# Patient Record
Sex: MALE | Race: WHITE | Employment: FULL TIME | URBAN - METROPOLITAN AREA
[De-identification: names, ages, dates, MRNs, and addresses within clinical notes are randomized per-mention and may not be internally consistent; named-entity substitution may affect disease eponyms.]

---

## 2018-11-01 PROBLEM — E66.3 OVERWEIGHT (BMI 25.0-29.9): Status: ACTIVE | Noted: 2018-11-01

## 2018-11-01 PROBLEM — G44.52 NEW DAILY PERSISTENT HEADACHE: Status: ACTIVE | Noted: 2018-11-01

## 2018-11-01 PROBLEM — K21.9 GERD (GASTROESOPHAGEAL REFLUX DISEASE): Status: ACTIVE | Noted: 2018-11-01

## 2022-03-19 PROBLEM — G44.52 NEW DAILY PERSISTENT HEADACHE: Status: ACTIVE | Noted: 2018-11-01

## 2022-03-19 PROBLEM — K21.9 GERD (GASTROESOPHAGEAL REFLUX DISEASE): Status: ACTIVE | Noted: 2018-11-01

## 2022-03-19 PROBLEM — E66.3 OVERWEIGHT (BMI 25.0-29.9): Status: ACTIVE | Noted: 2018-11-01

## 2022-05-07 ENCOUNTER — APPOINTMENT (OUTPATIENT)
Dept: ULTRASOUND IMAGING | Age: 37
End: 2022-05-07
Attending: EMERGENCY MEDICINE
Payer: COMMERCIAL

## 2022-05-07 ENCOUNTER — APPOINTMENT (OUTPATIENT)
Dept: CT IMAGING | Age: 37
End: 2022-05-07
Attending: EMERGENCY MEDICINE
Payer: COMMERCIAL

## 2022-05-07 ENCOUNTER — HOSPITAL ENCOUNTER (EMERGENCY)
Age: 37
Discharge: HOME | End: 2022-05-07
Attending: EMERGENCY MEDICINE
Payer: COMMERCIAL

## 2022-05-07 VITALS
TEMPERATURE: 98.5 F | BODY MASS INDEX: 28.77 KG/M2 | SYSTOLIC BLOOD PRESSURE: 129 MMHG | DIASTOLIC BLOOD PRESSURE: 98 MMHG | HEIGHT: 66 IN | HEART RATE: 80 BPM | WEIGHT: 179 LBS | RESPIRATION RATE: 18 BRPM | OXYGEN SATURATION: 100 %

## 2022-05-07 DIAGNOSIS — N50.3 CYST OF EPIDIDYMIS: ICD-10-CM

## 2022-05-07 DIAGNOSIS — R11.2 NAUSEA AND VOMITING, UNSPECIFIED VOMITING TYPE: Primary | ICD-10-CM

## 2022-05-07 DIAGNOSIS — K40.90 LEFT INGUINAL HERNIA: ICD-10-CM

## 2022-05-07 LAB
ALBUMIN SERPL-MCNC: 4 G/DL (ref 3.5–5)
ALBUMIN/GLOB SERPL: 1.1 {RATIO} (ref 1.2–3.5)
ALP SERPL-CCNC: 92 U/L (ref 50–136)
ALT SERPL-CCNC: 28 U/L (ref 12–65)
ANION GAP SERPL CALC-SCNC: 5 MMOL/L (ref 7–16)
AST SERPL-CCNC: 14 U/L (ref 15–37)
BASOPHILS # BLD: 0.1 K/UL (ref 0–0.2)
BASOPHILS NFR BLD: 1 % (ref 0–2)
BILIRUB SERPL-MCNC: 0.7 MG/DL (ref 0.2–1.1)
BILIRUB UR QL: NEGATIVE
BUN SERPL-MCNC: 18 MG/DL (ref 6–23)
CALCIUM SERPL-MCNC: 8.9 MG/DL (ref 8.3–10.4)
CHLORIDE SERPL-SCNC: 109 MMOL/L (ref 98–107)
CO2 SERPL-SCNC: 31 MMOL/L (ref 21–32)
CREAT SERPL-MCNC: 1.2 MG/DL (ref 0.8–1.5)
DIFFERENTIAL METHOD BLD: ABNORMAL
EOSINOPHIL # BLD: 0.1 K/UL (ref 0–0.8)
EOSINOPHIL NFR BLD: 1 % (ref 0.5–7.8)
ERYTHROCYTE [DISTWIDTH] IN BLOOD BY AUTOMATED COUNT: 12.5 % (ref 11.9–14.6)
GLOBULIN SER CALC-MCNC: 3.5 G/DL (ref 2.3–3.5)
GLUCOSE SERPL-MCNC: 120 MG/DL (ref 65–100)
GLUCOSE UR QL STRIP.AUTO: NEGATIVE MG/DL
HCT VFR BLD AUTO: 51.3 % (ref 41.1–50.3)
HGB BLD-MCNC: 16.6 G/DL (ref 13.6–17.2)
IMM GRANULOCYTES # BLD AUTO: 0 K/UL (ref 0–0.5)
IMM GRANULOCYTES NFR BLD AUTO: 0 % (ref 0–5)
KETONES UR-MCNC: NEGATIVE MG/DL
LEUKOCYTE ESTERASE UR QL STRIP: NEGATIVE
LIPASE SERPL-CCNC: 107 U/L (ref 73–393)
LYMPHOCYTES # BLD: 0.6 K/UL (ref 0.5–4.6)
LYMPHOCYTES NFR BLD: 6 % (ref 13–44)
MCH RBC QN AUTO: 28.2 PG (ref 26.1–32.9)
MCHC RBC AUTO-ENTMCNC: 32.4 G/DL (ref 31.4–35)
MCV RBC AUTO: 87.2 FL (ref 79.6–97.8)
MONOCYTES # BLD: 0.3 K/UL (ref 0.1–1.3)
MONOCYTES NFR BLD: 4 % (ref 4–12)
NEUTS SEG # BLD: 8.4 K/UL (ref 1.7–8.2)
NEUTS SEG NFR BLD: 89 % (ref 43–78)
NITRITE UR QL: NEGATIVE
NRBC # BLD: 0 K/UL (ref 0–0.2)
PH UR: 6 [PH] (ref 5–9)
PLATELET # BLD AUTO: 265 K/UL (ref 150–450)
PMV BLD AUTO: 9.6 FL (ref 9.4–12.3)
POTASSIUM SERPL-SCNC: 3.5 MMOL/L (ref 3.5–5.1)
PROT SERPL-MCNC: 7.5 G/DL (ref 6.3–8.2)
PROT UR QL: ABNORMAL MG/DL
RBC # BLD AUTO: 5.88 M/UL (ref 4.23–5.6)
RBC # UR STRIP: NEGATIVE /UL
SERVICE CMNT-IMP: ABNORMAL
SODIUM SERPL-SCNC: 145 MMOL/L (ref 136–145)
SP GR UR: >1.03 (ref 1–1.02)
UROBILINOGEN UR QL: 1 EU/DL (ref 0.2–1)
WBC # BLD AUTO: 9.5 K/UL (ref 4.3–11.1)

## 2022-05-07 PROCEDURE — 76870 US EXAM SCROTUM: CPT

## 2022-05-07 PROCEDURE — 96361 HYDRATE IV INFUSION ADD-ON: CPT

## 2022-05-07 PROCEDURE — 99285 EMERGENCY DEPT VISIT HI MDM: CPT

## 2022-05-07 PROCEDURE — 74011000636 HC RX REV CODE- 636: Performed by: EMERGENCY MEDICINE

## 2022-05-07 PROCEDURE — 96374 THER/PROPH/DIAG INJ IV PUSH: CPT

## 2022-05-07 PROCEDURE — 81003 URINALYSIS AUTO W/O SCOPE: CPT

## 2022-05-07 PROCEDURE — 83690 ASSAY OF LIPASE: CPT

## 2022-05-07 PROCEDURE — 74011000258 HC RX REV CODE- 258: Performed by: EMERGENCY MEDICINE

## 2022-05-07 PROCEDURE — 74177 CT ABD & PELVIS W/CONTRAST: CPT

## 2022-05-07 PROCEDURE — 85025 COMPLETE CBC W/AUTO DIFF WBC: CPT

## 2022-05-07 PROCEDURE — 80053 COMPREHEN METABOLIC PANEL: CPT

## 2022-05-07 PROCEDURE — 74011250636 HC RX REV CODE- 250/636: Performed by: EMERGENCY MEDICINE

## 2022-05-07 RX ORDER — ONDANSETRON 2 MG/ML
4 INJECTION INTRAMUSCULAR; INTRAVENOUS ONCE
Status: COMPLETED | OUTPATIENT
Start: 2022-05-07 | End: 2022-05-07

## 2022-05-07 RX ORDER — SODIUM CHLORIDE 0.9 % (FLUSH) 0.9 %
10 SYRINGE (ML) INJECTION
Status: COMPLETED | OUTPATIENT
Start: 2022-05-07 | End: 2022-05-07

## 2022-05-07 RX ORDER — ONDANSETRON 4 MG/1
4 TABLET, FILM COATED ORAL
Qty: 12 TABLET | Refills: 0 | Status: SHIPPED | OUTPATIENT
Start: 2022-05-07

## 2022-05-07 RX ADMIN — DIATRIZOATE MEGLUMINE AND DIATRIZOATE SODIUM 15 ML: 660; 100 LIQUID ORAL; RECTAL at 10:27

## 2022-05-07 RX ADMIN — Medication 10 ML: at 11:21

## 2022-05-07 RX ADMIN — IOPAMIDOL 100 ML: 755 INJECTION, SOLUTION INTRAVENOUS at 11:21

## 2022-05-07 RX ADMIN — ONDANSETRON 4 MG: 2 INJECTION INTRAMUSCULAR; INTRAVENOUS at 09:55

## 2022-05-07 RX ADMIN — SODIUM CHLORIDE 1000 ML: 9 INJECTION, SOLUTION INTRAVENOUS at 09:55

## 2022-05-07 RX ADMIN — SODIUM CHLORIDE 100 ML: 9 INJECTION, SOLUTION INTRAVENOUS at 11:21

## 2022-05-07 RX ADMIN — SODIUM CHLORIDE 1000 ML: 9 INJECTION, SOLUTION INTRAVENOUS at 14:33

## 2022-05-07 NOTE — Clinical Note
Venancio Vidal was seen and treated in our emergency department on 5/7/2022.         Melinda Beatty MD

## 2022-05-07 NOTE — ED TRIAGE NOTES
Pt ambulatory to triage. Pt reports that he has a known left sided inguinal hernia, pt has pain that is radiating into his abdomen, local to the left side. Pt states he started vomiting yesterday, approx 8 episodes, still has some nausea now. Pt denies any urinary issues, denies fever, diarrhea, chest pain, shob. Pt states that he saw a doctor for this last year in October and he stated that he was supposed to talk with a surgeon but never did.

## 2022-05-07 NOTE — ED PROVIDER NOTES
Patient accompanied by mother. Complains of left sided abdominal pain onset last evening while at work. States he felt a pop in the left inguinal area. Vomiting 7 - 8 times last night. Nausea. No diarrhea. Last BM last night normal. No hematemesis. No fever. No dysuria or hematuria. No history of kidney stones. Has a left sided hernia - has not seen a surgeon. No prior abdominal surgery. No alcohol or tobacco use. He is aware of a larger right testicle than left for years. No testicle pain. Past Medical History:   Diagnosis Date    GERD (gastroesophageal reflux disease)        No past surgical history on file. Family History:   Problem Relation Age of Onset    Hypertension Mother     Cancer Mother     No Known Problems Father     Cancer Maternal Grandfather        Social History     Socioeconomic History    Marital status:      Spouse name: Not on file    Number of children: Not on file    Years of education: Not on file    Highest education level: Not on file   Occupational History    Not on file   Tobacco Use    Smoking status: Never Smoker    Smokeless tobacco: Never Used   Substance and Sexual Activity    Alcohol use: No    Drug use: No     Types: Methamphetamines     Comment: in past used amphetamines    Sexual activity: Not on file   Other Topics Concern    Not on file   Social History Narrative    Not on file     Social Determinants of Health     Financial Resource Strain:     Difficulty of Paying Living Expenses: Not on file   Food Insecurity:     Worried About Running Out of Food in the Last Year: Not on file    Ivan of Food in the Last Year: Not on file   Transportation Needs:     Lack of Transportation (Medical): Not on file    Lack of Transportation (Non-Medical):  Not on file   Physical Activity:     Days of Exercise per Week: Not on file    Minutes of Exercise per Session: Not on file   Stress:     Feeling of Stress : Not on file   Social Connections:  Frequency of Communication with Friends and Family: Not on file    Frequency of Social Gatherings with Friends and Family: Not on file    Attends Confucianist Services: Not on file    Active Member of Clubs or Organizations: Not on file    Attends Club or Organization Meetings: Not on file    Marital Status: Not on file   Intimate Partner Violence:     Fear of Current or Ex-Partner: Not on file    Emotionally Abused: Not on file    Physically Abused: Not on file    Sexually Abused: Not on file   Housing Stability:     Unable to Pay for Housing in the Last Year: Not on file    Number of [place] in the Last Year: Not on file    Unstable Housing in the Last Year: Not on file         ALLERGIES: Patient has no known allergies. Review of Systems   Constitutional: Negative for chills and fever. HENT: Negative. Eyes: Negative. Respiratory: Negative. Cardiovascular: Negative. Gastrointestinal: Positive for abdominal pain, nausea and vomiting. Negative for diarrhea. Genitourinary: Negative. Negative for dysuria and hematuria. Musculoskeletal: Negative. Skin: Negative. Neurological: Negative. Hematological: Negative. Psychiatric/Behavioral: Negative. There were no vitals filed for this visit. Physical Exam  Vitals and nursing note reviewed. Constitutional:       Appearance: He is well-developed. HENT:      Head: Normocephalic and atraumatic. Right Ear: External ear normal.      Left Ear: External ear normal.   Eyes:      General: No scleral icterus. Conjunctiva/sclera: Conjunctivae normal.      Pupils: Pupils are equal, round, and reactive to light. Neck:      Vascular: No JVD. Cardiovascular:      Rate and Rhythm: Normal rate and regular rhythm. Heart sounds: Normal heart sounds. Pulmonary:      Effort: Pulmonary effort is normal.      Breath sounds: Normal breath sounds.    Abdominal:      General: Bowel sounds are normal. Palpations: Abdomen is soft. There is no mass. Tenderness: There is abdominal tenderness (LLQ and LUQ). There is no right CVA tenderness, left CVA tenderness, guarding or rebound. Hernia: A hernia (left inguinal hernia not tender or red. easily reduced. ) is present. Genitourinary:     Penis: Normal.       Comments: Left testes normal, right testicle not tender. There is a mass at the superior aspect of the right testicle / epididymis. Not red or tender. Musculoskeletal:         General: No tenderness. Normal range of motion. Cervical back: Normal range of motion and neck supple. Skin:     General: Skin is warm and dry. Neurological:      Mental Status: He is alert and oriented to person, place, and time. Psychiatric:         Behavior: Behavior normal.          MDM  Number of Diagnoses or Management Options  Cyst of epididymis  Left inguinal hernia  Nausea and vomiting, unspecified vomiting type  Diagnosis management comments: Left sided abdominal pain  Nausea and vomiting  Labs reviewed  Urine concentrated, no uti  Zofran 4 mg IV, NS 2 liters IVF  CT abdomen - no obstruction, no kidney stones, fat in inguinal hernia  US scrotum - epididymis cyst on the right. Improved. No further vomiting or pain. Results and instructions discussed with patient  Rx Zofran  Clear liquids  Follow up with Gen Surg for consideration of hernia repair  Follow up with Urology for evaluation of cyst on epididymis  Establish PCP - [facility] number given to patient  Return with new or worse symptoms.        Amount and/or Complexity of Data Reviewed  Clinical lab tests: ordered and reviewed  Tests in the radiology section of CPT®: ordered and reviewed  Decide to obtain previous medical records or to obtain history from someone other than the patient: yes  Review and summarize past medical records: yes  Independent visualization of images, tracings, or specimens: yes    Patient Progress  Patient progress: improved Procedures

## 2022-05-07 NOTE — Clinical Note
[address] [facility] EMERGENCY DEPT   [facility] [id number] [phone]    Work/School Note    Date: 5/7/2022    To Whom It May concern:    Dior Gonzalez was seen and treated today in the emergency room by the following provider(s):  Attending Provider: Aarti Coleman MD.      Dior Gonzalez is excused from work/school on 05/07/22 and 05/08/22. He is medically clear to return to work/school on 5/9/2022.        Sincerely,          Jacqueline Neal MD

## 2022-05-07 NOTE — Clinical Note
[address] [facility] EMERGENCY DEPT   [facility] [id number] [phone]    Work/School Note    Date: 5/7/2022    To Whom It May concern:    Berenice Marie was seen and treated today in the emergency room by the following provider(s):  Attending Provider: Jannet Kumar MD.      Berenice Marie is excused from work/school on 05/07/22 and 05/08/22. He is medically clear to return to work/school on 5/9/2022.        Sincerely,          Edward Jimenez RN

## 2022-05-07 NOTE — ED NOTES
I have reviewed discharge instructions with the patient. The patient verbalized understanding. Patient left ED via Discharge Method: ambulatory to Home. Opportunity for questions and clarification provided. Patient given 1 scripts. To continue your aftercare when you leave the hospital, you may receive an automated call from our care team to check in on how you are doing. This is a free service and part of our promise to provide the best care and service to meet your aftercare needs.  If you have questions, or wish to unsubscribe from this service please call [phone]. Thank you for Choosing our [address] [place] [address] Emergency Department.

## 2022-05-12 ENCOUNTER — APPOINTMENT (OUTPATIENT)
Dept: CT IMAGING | Age: 37
End: 2022-05-12
Attending: EMERGENCY MEDICINE
Payer: COMMERCIAL

## 2022-05-12 ENCOUNTER — HOSPITAL ENCOUNTER (OUTPATIENT)
Age: 37
Setting detail: OBSERVATION
Discharge: OTHER INSTITUTION | End: 2022-05-13
Attending: EMERGENCY MEDICINE | Admitting: FAMILY MEDICINE
Payer: COMMERCIAL

## 2022-05-12 DIAGNOSIS — K52.9 ENTERITIS: Primary | ICD-10-CM

## 2022-05-12 DIAGNOSIS — K22.89 ESOPHAGEAL THICKENING: ICD-10-CM

## 2022-05-12 DIAGNOSIS — K52.9 COLITIS PRESUMED INFECTIOUS: ICD-10-CM

## 2022-05-12 PROBLEM — R59.1 LYMPHADENOPATHY: Status: ACTIVE | Noted: 2022-05-12

## 2022-05-12 PROBLEM — K40.91 UNILATERAL INGUINAL HERNIA, WITHOUT OBSTRUCTION OR GANGRENE, RECURRENT: Status: ACTIVE | Noted: 2022-05-12

## 2022-05-12 PROBLEM — A09 INFECTIOUS GASTROENTERITIS AND COLITIS: Status: ACTIVE | Noted: 2022-05-12

## 2022-05-12 LAB
ALBUMIN SERPL-MCNC: 3.7 G/DL (ref 3.5–5)
ALBUMIN/GLOB SERPL: 1.1 {RATIO} (ref 1.2–3.5)
ALP SERPL-CCNC: 80 U/L (ref 50–136)
ALT SERPL-CCNC: 43 U/L (ref 12–65)
ANION GAP SERPL CALC-SCNC: 6 MMOL/L (ref 7–16)
AST SERPL-CCNC: 20 U/L (ref 15–37)
BACTERIA SPEC CULT: NORMAL
BASOPHILS # BLD: 0.1 K/UL (ref 0–0.2)
BASOPHILS NFR BLD: 1 % (ref 0–2)
BILIRUB SERPL-MCNC: 0.2 MG/DL (ref 0.2–1.1)
BILIRUB UR QL: NEGATIVE
BUN SERPL-MCNC: 10 MG/DL (ref 6–23)
CALCIUM SERPL-MCNC: 8.7 MG/DL (ref 8.3–10.4)
CHLORIDE SERPL-SCNC: 107 MMOL/L (ref 98–107)
CO2 SERPL-SCNC: 28 MMOL/L (ref 21–32)
CREAT SERPL-MCNC: 1 MG/DL (ref 0.8–1.5)
DIFFERENTIAL METHOD BLD: ABNORMAL
EOSINOPHIL # BLD: 0.2 K/UL (ref 0–0.8)
EOSINOPHIL NFR BLD: 3 % (ref 0.5–7.8)
ERYTHROCYTE [DISTWIDTH] IN BLOOD BY AUTOMATED COUNT: 12.2 % (ref 11.9–14.6)
GLOBULIN SER CALC-MCNC: 3.3 G/DL (ref 2.3–3.5)
GLUCOSE SERPL-MCNC: 117 MG/DL (ref 65–100)
GLUCOSE UR QL STRIP.AUTO: NEGATIVE MG/DL
HCT VFR BLD AUTO: 48.8 % (ref 41.1–50.3)
HGB BLD-MCNC: 16.1 G/DL (ref 13.6–17.2)
IMM GRANULOCYTES # BLD AUTO: 0 K/UL (ref 0–0.5)
IMM GRANULOCYTES NFR BLD AUTO: 1 % (ref 0–5)
KETONES UR-MCNC: NEGATIVE MG/DL
LEUKOCYTE ESTERASE UR QL STRIP: NEGATIVE
LIPASE SERPL-CCNC: 535 U/L (ref 73–393)
LYMPHOCYTES # BLD: 2.1 K/UL (ref 0.5–4.6)
LYMPHOCYTES NFR BLD: 30 % (ref 13–44)
MAGNESIUM SERPL-MCNC: 2.3 MG/DL (ref 1.8–2.4)
MCH RBC QN AUTO: 27.9 PG (ref 26.1–32.9)
MCHC RBC AUTO-ENTMCNC: 33 G/DL (ref 31.4–35)
MCV RBC AUTO: 84.6 FL (ref 79.6–97.8)
MONOCYTES # BLD: 0.5 K/UL (ref 0.1–1.3)
MONOCYTES NFR BLD: 6 % (ref 4–12)
NEUTS SEG # BLD: 4.2 K/UL (ref 1.7–8.2)
NEUTS SEG NFR BLD: 59 % (ref 43–78)
NITRITE UR QL: NEGATIVE
NRBC # BLD: 0 K/UL (ref 0–0.2)
PH UR: 7 [PH] (ref 5–9)
PLATELET # BLD AUTO: 289 K/UL (ref 150–450)
PMV BLD AUTO: 10 FL (ref 9.4–12.3)
POTASSIUM SERPL-SCNC: 3.3 MMOL/L (ref 3.5–5.1)
PROT SERPL-MCNC: 7 G/DL (ref 6.3–8.2)
PROT UR QL: NEGATIVE MG/DL
RBC # BLD AUTO: 5.77 M/UL (ref 4.23–5.6)
RBC # UR STRIP: NEGATIVE /UL
SERVICE CMNT-IMP: NORMAL
SERVICE CMNT-IMP: NORMAL
SODIUM SERPL-SCNC: 141 MMOL/L (ref 138–145)
SP GR UR: 1.01 (ref 1–1.02)
UROBILINOGEN UR QL: 0.2 EU/DL (ref 0.2–1)
WBC # BLD AUTO: 7.1 K/UL (ref 4.3–11.1)

## 2022-05-12 PROCEDURE — 80053 COMPREHEN METABOLIC PANEL: CPT

## 2022-05-12 PROCEDURE — 74011250637 HC RX REV CODE- 250/637: Performed by: FAMILY MEDICINE

## 2022-05-12 PROCEDURE — 87493 C DIFF AMPLIFIED PROBE: CPT

## 2022-05-12 PROCEDURE — 74011250636 HC RX REV CODE- 250/636: Performed by: FAMILY MEDICINE

## 2022-05-12 PROCEDURE — 74011000250 HC RX REV CODE- 250: Performed by: EMERGENCY MEDICINE

## 2022-05-12 PROCEDURE — 99285 EMERGENCY DEPT VISIT HI MDM: CPT

## 2022-05-12 PROCEDURE — 96365 THER/PROPH/DIAG IV INF INIT: CPT

## 2022-05-12 PROCEDURE — 83735 ASSAY OF MAGNESIUM: CPT

## 2022-05-12 PROCEDURE — 96375 TX/PRO/DX INJ NEW DRUG ADDON: CPT

## 2022-05-12 PROCEDURE — 74011250636 HC RX REV CODE- 250/636: Performed by: EMERGENCY MEDICINE

## 2022-05-12 PROCEDURE — 96361 HYDRATE IV INFUSION ADD-ON: CPT

## 2022-05-12 PROCEDURE — 96374 THER/PROPH/DIAG INJ IV PUSH: CPT

## 2022-05-12 PROCEDURE — 74011250637 HC RX REV CODE- 250/637: Performed by: EMERGENCY MEDICINE

## 2022-05-12 PROCEDURE — 74011000258 HC RX REV CODE- 258: Performed by: EMERGENCY MEDICINE

## 2022-05-12 PROCEDURE — 83690 ASSAY OF LIPASE: CPT

## 2022-05-12 PROCEDURE — 74177 CT ABD & PELVIS W/CONTRAST: CPT

## 2022-05-12 PROCEDURE — 74011000636 HC RX REV CODE- 636: Performed by: EMERGENCY MEDICINE

## 2022-05-12 PROCEDURE — 87045 FECES CULTURE AEROBIC BACT: CPT

## 2022-05-12 PROCEDURE — 85025 COMPLETE CBC W/AUTO DIFF WBC: CPT

## 2022-05-12 PROCEDURE — G0378 HOSPITAL OBSERVATION PER HR: HCPCS

## 2022-05-12 PROCEDURE — 81003 URINALYSIS AUTO W/O SCOPE: CPT

## 2022-05-12 RX ORDER — PANTOPRAZOLE SODIUM 40 MG/1
40 TABLET, DELAYED RELEASE ORAL
Status: DISCONTINUED | OUTPATIENT
Start: 2022-05-13 | End: 2022-05-13 | Stop reason: HOSPADM

## 2022-05-12 RX ORDER — UREA 10 %
2 LOTION (ML) TOPICAL 2 TIMES DAILY
Status: DISCONTINUED | OUTPATIENT
Start: 2022-05-12 | End: 2022-05-13 | Stop reason: HOSPADM

## 2022-05-12 RX ORDER — HYOSCYAMINE SULFATE 0.12 MG/1
0.12 TABLET SUBLINGUAL
Qty: 12 TABLET | Status: SHIPPED | OUTPATIENT
Start: 2022-05-12 | End: 2022-05-13

## 2022-05-12 RX ORDER — LOPERAMIDE HYDROCHLORIDE 2 MG/1
2 CAPSULE ORAL
Status: DISCONTINUED | OUTPATIENT
Start: 2022-05-12 | End: 2022-05-13 | Stop reason: HOSPADM

## 2022-05-12 RX ORDER — SODIUM CHLORIDE 0.9 % (FLUSH) 0.9 %
10 SYRINGE (ML) INJECTION
Status: COMPLETED | OUTPATIENT
Start: 2022-05-12 | End: 2022-05-12

## 2022-05-12 RX ORDER — SODIUM CHLORIDE 0.9 % (FLUSH) 0.9 %
5-40 SYRINGE (ML) INJECTION AS NEEDED
Status: DISCONTINUED | OUTPATIENT
Start: 2022-05-12 | End: 2022-05-13 | Stop reason: HOSPADM

## 2022-05-12 RX ORDER — METRONIDAZOLE 500 MG/1
500 TABLET ORAL 3 TIMES DAILY
Status: DISCONTINUED | OUTPATIENT
Start: 2022-05-12 | End: 2022-05-13 | Stop reason: HOSPADM

## 2022-05-12 RX ORDER — CIPROFLOXACIN 2 MG/ML
400 INJECTION, SOLUTION INTRAVENOUS EVERY 12 HOURS
Status: DISCONTINUED | OUTPATIENT
Start: 2022-05-12 | End: 2022-05-13 | Stop reason: HOSPADM

## 2022-05-12 RX ORDER — METRONIDAZOLE 500 MG/1
500 TABLET ORAL 3 TIMES DAILY
Qty: 30 TABLET | Refills: 0 | Status: SHIPPED | OUTPATIENT
Start: 2022-05-12 | End: 2022-05-13

## 2022-05-12 RX ORDER — ENOXAPARIN SODIUM 100 MG/ML
40 INJECTION SUBCUTANEOUS DAILY
Status: DISCONTINUED | OUTPATIENT
Start: 2022-05-13 | End: 2022-05-13 | Stop reason: HOSPADM

## 2022-05-12 RX ORDER — ONDANSETRON 2 MG/ML
4 INJECTION INTRAMUSCULAR; INTRAVENOUS
Status: COMPLETED | OUTPATIENT
Start: 2022-05-12 | End: 2022-05-12

## 2022-05-12 RX ORDER — ONDANSETRON 4 MG/1
4 TABLET, ORALLY DISINTEGRATING ORAL
Status: DISCONTINUED | OUTPATIENT
Start: 2022-05-12 | End: 2022-05-13 | Stop reason: HOSPADM

## 2022-05-12 RX ORDER — SODIUM CHLORIDE 0.9 % (FLUSH) 0.9 %
5-10 SYRINGE (ML) INJECTION AS NEEDED
Status: DISCONTINUED | OUTPATIENT
Start: 2022-05-12 | End: 2022-05-13 | Stop reason: HOSPADM

## 2022-05-12 RX ORDER — CIPROFLOXACIN 500 MG/1
500 TABLET ORAL 2 TIMES DAILY
Qty: 20 TABLET | Refills: 0 | Status: SHIPPED | OUTPATIENT
Start: 2022-05-12 | End: 2022-05-13

## 2022-05-12 RX ORDER — POLYETHYLENE GLYCOL 3350 17 G/17G
17 POWDER, FOR SOLUTION ORAL DAILY PRN
Status: DISCONTINUED | OUTPATIENT
Start: 2022-05-12 | End: 2022-05-13 | Stop reason: HOSPADM

## 2022-05-12 RX ORDER — HYOSCYAMINE SULFATE 0.12 MG/1
0.25 TABLET SUBLINGUAL
Status: COMPLETED | OUTPATIENT
Start: 2022-05-12 | End: 2022-05-12

## 2022-05-12 RX ORDER — ACETAMINOPHEN 325 MG/1
650 TABLET ORAL
Status: DISCONTINUED | OUTPATIENT
Start: 2022-05-12 | End: 2022-05-13 | Stop reason: HOSPADM

## 2022-05-12 RX ORDER — ONDANSETRON 2 MG/ML
4 INJECTION INTRAMUSCULAR; INTRAVENOUS
Status: DISCONTINUED | OUTPATIENT
Start: 2022-05-12 | End: 2022-05-13 | Stop reason: HOSPADM

## 2022-05-12 RX ORDER — SODIUM CHLORIDE, SODIUM LACTATE, POTASSIUM CHLORIDE, CALCIUM CHLORIDE 600; 310; 30; 20 MG/100ML; MG/100ML; MG/100ML; MG/100ML
125 INJECTION, SOLUTION INTRAVENOUS CONTINUOUS
Status: DISCONTINUED | OUTPATIENT
Start: 2022-05-12 | End: 2022-05-13 | Stop reason: CLARIF

## 2022-05-12 RX ORDER — ACETAMINOPHEN 650 MG/1
650 SUPPOSITORY RECTAL
Status: DISCONTINUED | OUTPATIENT
Start: 2022-05-12 | End: 2022-05-13 | Stop reason: HOSPADM

## 2022-05-12 RX ORDER — SODIUM CHLORIDE 0.9 % (FLUSH) 0.9 %
5-10 SYRINGE (ML) INJECTION EVERY 8 HOURS
Status: DISCONTINUED | OUTPATIENT
Start: 2022-05-12 | End: 2022-05-13 | Stop reason: HOSPADM

## 2022-05-12 RX ORDER — SODIUM CHLORIDE 9 MG/ML
1000 INJECTION, SOLUTION INTRAVENOUS ONCE
Status: COMPLETED | OUTPATIENT
Start: 2022-05-12 | End: 2022-05-12

## 2022-05-12 RX ORDER — PROMETHAZINE HYDROCHLORIDE 25 MG/1
25 TABLET ORAL
Qty: 12 TABLET | Refills: 0 | Status: SHIPPED | OUTPATIENT
Start: 2022-05-12

## 2022-05-12 RX ORDER — POTASSIUM CHLORIDE 20 MEQ/1
40 TABLET, EXTENDED RELEASE ORAL 2 TIMES DAILY
Status: DISCONTINUED | OUTPATIENT
Start: 2022-05-12 | End: 2022-05-13 | Stop reason: HOSPADM

## 2022-05-12 RX ORDER — ONDANSETRON 8 MG/1
8 TABLET, ORALLY DISINTEGRATING ORAL
Qty: 12 TABLET | Refills: 0 | Status: SHIPPED | OUTPATIENT
Start: 2022-05-12 | End: 2022-05-13

## 2022-05-12 RX ORDER — SODIUM CHLORIDE 0.9 % (FLUSH) 0.9 %
5-40 SYRINGE (ML) INJECTION EVERY 8 HOURS
Status: DISCONTINUED | OUTPATIENT
Start: 2022-05-12 | End: 2022-05-13 | Stop reason: HOSPADM

## 2022-05-12 RX ORDER — ACETAMINOPHEN 500 MG
1000 TABLET ORAL
Status: COMPLETED | OUTPATIENT
Start: 2022-05-12 | End: 2022-05-12

## 2022-05-12 RX ADMIN — POTASSIUM CHLORIDE 40 MEQ: 20 TABLET, EXTENDED RELEASE ORAL at 22:32

## 2022-05-12 RX ADMIN — SODIUM CHLORIDE 100 ML: 9 INJECTION, SOLUTION INTRAVENOUS at 15:40

## 2022-05-12 RX ADMIN — HYOSCYAMINE SULFATE 0.25 MG: 0.12 TABLET ORAL; SUBLINGUAL at 15:25

## 2022-05-12 RX ADMIN — ONDANSETRON 4 MG: 2 INJECTION INTRAMUSCULAR; INTRAVENOUS at 15:25

## 2022-05-12 RX ADMIN — CIPROFLOXACIN 400 MG: 2 INJECTION, SOLUTION INTRAVENOUS at 22:26

## 2022-05-12 RX ADMIN — METRONIDAZOLE 500 MG: 500 TABLET ORAL at 22:32

## 2022-05-12 RX ADMIN — IOPAMIDOL 100 ML: 755 INJECTION, SOLUTION INTRAVENOUS at 15:40

## 2022-05-12 RX ADMIN — ACETAMINOPHEN 1000 MG: 500 TABLET, FILM COATED ORAL at 18:11

## 2022-05-12 RX ADMIN — SODIUM CHLORIDE, PRESERVATIVE FREE 10 ML: 5 INJECTION INTRAVENOUS at 15:40

## 2022-05-12 RX ADMIN — SODIUM CHLORIDE 1000 ML: 9 INJECTION, SOLUTION INTRAVENOUS at 15:25

## 2022-05-12 NOTE — ED TRIAGE NOTES
Patient presents from home with complaints of nausea/vomiting and diarrhea. Patient was seen 5.7 and states that since he has been home he has had continued vomiting and diarrhea. In addition patient with continued abdominal pain.

## 2022-05-12 NOTE — ED PROVIDER NOTES
[facility] Emergency Department Provider Note                     PCP:                Teri Covarrubias DO               Age: 39 y.o. Sex: M           ICD-10-CM ICD-9-CM    1. Enteritis  K52.9 558.9    2. Colitis presumed infectious  K52.9 009.1    3. Esophageal thickening  K22.89 530.89             MDM  Number of Diagnoses or Management Options  Colitis presumed infectious  Enteritis  Esophageal thickening  Diagnosis management comments: Clinically the patient's not dehydrated and his kidney function remains intact, however I will provide some IV fluids and medications for abdominal cramping and nausea. Clinically he does not have pancreatitis and likely his lipase is elevated as a result of his nausea and vomiting and not as a cause. Repeat CT scan imaging to evaluate for colitis or diverticulitis and need for an antibiotic. We will obtain a stool culture today if we can and if not I will prescribe test to be returned from home for C. difficile and stool culture. 4:53 PM  Stool culture obtained. Patient be started on Cipro and Flagyl and provided follow-up with [facility]. He will need an outpatient EGD due to the distal esophagus swelling and swollen lymph node. Patient and his family informed of this and the concern for possible esophageal malignancy/cancer. Vianey Carlson 5:59 PM  Hospitalist consulted for admission. The patient's wife is demanding admission and called GI herself. She has concerns he will not follow up as instructed.        Amount and/or Complexity of Data Reviewed  Clinical lab tests: ordered and reviewed  Tests in the radiology section of CPT®: ordered and reviewed  Review and summarize past medical records: yes  Discuss the patient with other providers: yes (Communicated with GI regarding follow-up and starting antibiotics.)    Risk of Complications, Morbidity, and/or Mortality  Presenting problems: moderate  Diagnostic procedures: low  Management options: low    Patient Progress  Patient progress: stable      Orders Placed This Encounter    CULTURE, STOOL    C. DIFFICILE/EPI PCR    CT ABD PELV W CONT    CBC with Diff    CMP    Magnesium    Lipase    POC Urine Dipstick    PULSE OXIMETRY CONTINUOUS    POC URINE MACROSCOPIC    SALINE LOCK IV ONE TIME Routine    sodium chloride (NS) flush 5-10 mL    sodium chloride (NS) flush 5-10 mL    0.9% sodium chloride infusion 1,000 mL    ondansetron (ZOFRAN) injection 4 mg    hyoscyamine SL (LEVSIN/SL) tablet 0.25 mg    sodium chloride 0.9 % bolus infusion 100 mL    iopamidoL (ISOVUE-370) 76 % injection 100 mL    saline peripheral flush soln 10 mL    ciprofloxacin HCl (Cipro) 500 mg tablet    metroNIDAZOLE (FlagyL) 500 mg tablet    ondansetron (ZOFRAN ODT) 8 mg disintegrating tablet    promethazine (PHENERGAN) 25 mg tablet    hyoscyamine SL (LEVSIN/SL) 0.125 mg SL tablet    acetaminophen (TYLENOL) tablet 1,000 mg        Sonido Smith is a 39 y.o. male who presents to the Emergency Department with chief complaint of    Chief Complaint   Patient presents with    Diarrhea    Vomiting      61-year-old male who denies any past medical history presents with continued nausea vomiting and diarrhea since this past Sunday when he was seen and evaluated for concern about whether his symptoms were due to a hernia. He has follow-up with surgery for that. He is continued to have several episodes of nausea and vomiting and diarrhea daily since then and has not improved. His diarrhea is brown and runny and there is no blood or black stool. His vomiting is described as nonbilious and nonbloody and just what ever he has on his stomach. He denies any recent travel or antibiotic use. His family request inquires about his mildly elevated lipase today compared to a normal  on Sunday. His pain is described as sharp and cramping in the lower abdomen and he has some pain in his lower back.   He denies any aggravating or alleviating factors. The pain is intermittent. Review of Systems   Constitutional: Negative for chills and fever. HENT: Negative for congestion and rhinorrhea. Respiratory: Negative for cough and shortness of breath. Cardiovascular: Negative for chest pain and leg swelling. Gastrointestinal: Positive for abdominal pain, diarrhea, nausea and vomiting. Genitourinary: Negative for dysuria, frequency and urgency. Musculoskeletal: Positive for back pain. Negative for myalgias. All other systems reviewed and are negative. All other systems reviewed and are negative. Past Medical History:   Diagnosis Date    GERD (gastroesophageal reflux disease)         No past surgical history on file. Family History   Problem Relation Age of Onset    Hypertension Mother     Cancer Mother     No Known Problems Father     Cancer Maternal Grandfather            Social Connections:     Frequency of Communication with Friends and Family: Not on file    Frequency of Social Gatherings with Friends and Family: Not on file    Attends Mosque Services: Not on file    Active Member of Clubs or Organizations: Not on file    Attends Club or Organization Meetings: Not on file    Marital Status: Not on file        No Known Allergies     Vitals signs and nursing note reviewed. Patient Vitals for the past 4 hrs:   BP SpO2   05/12/22 1735 (!) 139/104 99 %   05/12/22 1720 (!) 132/93 --   05/12/22 1705 (!) 124/98 99 %   05/12/22 1650 (!) 131/100 100 %   05/12/22 1635 (!) 138/95 100 %   05/12/22 1620 (!) 140/96 --   05/12/22 1535 (!) 139/99 97 %   05/12/22 1520 (!) 138/102 96 %          Physical Exam  Vitals and nursing note reviewed. Constitutional:       Appearance: He is well-developed. HENT:      Mouth/Throat:      Mouth: Mucous membranes are moist.      Pharynx: No oropharyngeal exudate. Eyes:      Conjunctiva/sclera: Conjunctivae normal.      Pupils: Pupils are equal, round, and reactive to light. Cardiovascular:      Rate and Rhythm: Normal rate and regular rhythm. Heart sounds: Normal heart sounds. Pulmonary:      Effort: Pulmonary effort is normal.      Breath sounds: Normal breath sounds. Abdominal:      General: Bowel sounds are normal. There is no distension. Palpations: Abdomen is soft. Tenderness: There is abdominal tenderness ( Tenderness diffuse lower abdomen but worse in the left lower quadrant). There is no guarding or rebound. Comments: No epigastric tenderness   Musculoskeletal:         General: No tenderness. Normal range of motion. Cervical back: Neck supple. Right lower leg: No edema. Left lower leg: No edema. Lymphadenopathy:      Cervical: No cervical adenopathy. Skin:     General: Skin is warm and dry. Neurological:      Mental Status: He is alert and oriented to person, place, and time. Procedures    Results for orders placed or performed during the hospital encounter of 05/12/22   C. DIFFICILE/EPI PCR    Specimen: Stool   Result Value Ref Range    Special Requests: NO SPECIAL REQUESTS      Culture result:        Toxigenic C. difficile NEGATIVE                         C. difficile target DNA sequences are not detected. CT ABD PELV W CONT    Narrative    CT ABDOMEN AND PELVIS WITH INTRAVENOUS CONTRAST DATED 5/12/2022. History: Abdominal pain, nausea, vomiting, and diarrhea. Comparison: CT abdomen and pelvis with contrast 5/7/2022     Technique:   Multiple contiguous helical CT images reconstructed at 5 mm  intervals were obtained from above the diaphragms through the ischial  tuberosities following oral and 100 cc Isovue-370 without acute complication. All CT scans performed at this facility use one or all of the following:  Automated exposure control, adjustment of the mA and/or kVp according to  patient's size, iterative reconstruction.     Findings:  CT ABDOMEN:    Limited evaluation of the lung bases and base of the mediastinum demonstrates a  persistent enlarged paraesophageal lymph node which now measures 13 mm in size. A small hiatal hernia is once again seen. The distal esophagus just proximal to  this level does appear thick-walled. The Liver is homogeneous in attenuation. The spleen is homogeneous in  attenuation. No contour deforming or enhancing mass lesions are seen of the  pancreas or adrenal glands. The gallbladder has an unremarkable CT appearance  without radiopaque stones or pericholecystic fluid/inflammatory changes. The  kidneys enhance symmetrically and no evidence of hydronephrosis is seen. A  stable subcentimeter hypodense intracortical lesion is seen in the posterior mid  pole cortex of the right kidney measuring 6 mm in size. This is too small to  characterize although does not demonstrate clearly suspicious enhancement. The visualized loops of small bowel and colon are normal in caliber. Small bowel  wall thickening is seen of more proximal small bowel loops in the left abdomen. This is a nonspecific finding although can been indicator of acute small bowel  inflammation (i.e. enteritis). The appendix is seen on axial image 58 without  acute abnormality. Mild mesenteric edema is now seen adjacent to the proximal  sigmoid colon on axial image 75 although no acute inflammatory wall changes are  seen. No free fluid, free air, or focal inflammatory changes are seen in the  abdomen. No adenopathy is seen. The abdominal aorta is unremarkable in  appearance. CT PELVIS:  No abnormal pelvic fluid collections or inflammatory changes are present. No  pelvic adenopathy is seen. The urinary bladder is unremarkable. A small  fat-containing left inguinal hernia is seen. Impression    1. Thick-walled small bowel loops. Although nonspecific, these can indicate  acute inflammation.  In addition, new mild stranding is seen adjacent to the  proximal sigmoid colon which could indicate an additional limited colitis. Recommend correlation for signs/symptoms of potential enterocolitis. 2. Persistent enlarged paraesophageal mediastinal lymph node which is  incompletely characterized. There does appear to be a small hiatal hernia. In  addition, some wall thickening of the partially visualized distal esophagus is  seen. The lymph node could be related to these esophageal wall changes. The  esophagus would be best further assessed with endoscopy. The lymph node can be  further assessed with PET imaging. If not performed, then a follow-up CT chest  no later than 3 months from now should be performed to ensure subsequent  stability or improvement. This report was made using voice transcription. Despite my best efforts to avoid  any, transcription errors may persist. If there is any question about the  accuracy of the report or need for clarification, then please call [phone], or text me through [email or web address] for clarification or correction. CBC WITH AUTOMATED DIFF   Result Value Ref Range    WBC 7.1 4.3 - 11.1 K/uL    RBC 5.77 (H) 4.23 - 5.6 M/uL    HGB 16.1 13.6 - 17.2 g/dL    HCT 48.8 41.1 - 50.3 %    MCV 84.6 79.6 - 97.8 FL    MCH 27.9 26.1 - 32.9 PG    MCHC 33.0 31.4 - 35.0 g/dL    RDW 12.2 11.9 - 14.6 %    PLATELET 524 363 - 975 K/uL    MPV 10.0 9.4 - 12.3 FL    ABSOLUTE NRBC 0.00 0.0 - 0.2 K/uL    DF AUTOMATED      NEUTROPHILS 59 43 - 78 %    LYMPHOCYTES 30 13 - 44 %    MONOCYTES 6 4.0 - 12.0 %    EOSINOPHILS 3 0.5 - 7.8 %    BASOPHILS 1 0.0 - 2.0 %    IMMATURE GRANULOCYTES 1 0.0 - 5.0 %    ABS. NEUTROPHILS 4.2 1.7 - 8.2 K/UL    ABS. LYMPHOCYTES 2.1 0.5 - 4.6 K/UL    ABS. MONOCYTES 0.5 0.1 - 1.3 K/UL    ABS. EOSINOPHILS 0.2 0.0 - 0.8 K/UL    ABS. BASOPHILS 0.1 0.0 - 0.2 K/UL    ABS. IMM.  GRANS. 0.0 0.0 - 0.5 K/UL   METABOLIC PANEL, COMPREHENSIVE   Result Value Ref Range    Sodium 141 138 - 145 mmol/L    Potassium 3.3 (L) 3.5 - 5.1 mmol/L    Chloride 107 98 - 107 mmol/L    CO2 28 21 - 32 mmol/L Anion gap 6 (L) 7 - 16 mmol/L    Glucose 117 (H) 65 - 100 mg/dL    BUN 10 6 - 23 MG/DL    Creatinine 1.00 0.8 - 1.5 MG/DL    GFR est AA >60 >60 ml/min/1.73m2    GFR est non-AA >60 >60 ml/min/1.73m2    Calcium 8.7 8.3 - 10.4 MG/DL    Bilirubin, total 0.2 0.2 - 1.1 MG/DL    ALT (SGPT) 43 12 - 65 U/L    AST (SGOT) 20 15 - 37 U/L    Alk. phosphatase 80 50 - 136 U/L    Protein, total 7.0 6.3 - 8.2 g/dL    Albumin 3.7 3.5 - 5.0 g/dL    Globulin 3.3 2.3 - 3.5 g/dL    A-G Ratio 1.1 (L) 1.2 - 3.5     MAGNESIUM   Result Value Ref Range    Magnesium 2.3 1.8 - 2.4 mg/dL   LIPASE   Result Value Ref Range    Lipase 535 (H) 73 - 393 U/L   POC URINE MACROSCOPIC   Result Value Ref Range    Spec. gravity (POC) 1.015 1.001 - 1.023      pH, urine  (POC) 7.0 5.0 - 9.0      Protein (POC) Negative NEG mg/dL    Glucose, urine (POC) Negative NEG mg/dL    Ketones (POC) Negative NEG mg/dL    Bilirubin (POC) Negative NEG      Blood (POC) Negative NEG      Urobilinogen (POC) 0.2 0.2 - 1.0 EU/dL    Nitrite (POC) Negative NEG      Leukocyte esterase (POC) Negative NEG      Performed by [facility]         CT ABD PELV W CONT   Final Result   1. Thick-walled small bowel loops. Although nonspecific, these can indicate   acute inflammation. In addition, new mild stranding is seen adjacent to the   proximal sigmoid colon which could indicate an additional limited colitis. Recommend correlation for signs/symptoms of potential enterocolitis. 2. Persistent enlarged paraesophageal mediastinal lymph node which is   incompletely characterized. There does appear to be a small hiatal hernia. In   addition, some wall thickening of the partially visualized distal esophagus is   seen. The lymph node could be related to these esophageal wall changes. The   esophagus would be best further assessed with endoscopy. The lymph node can be   further assessed with PET imaging.  If not performed, then a follow-up CT chest   no later than 3 months from now should be performed to ensure subsequent   stability or improvement. This report was made using voice transcription. Despite my best efforts to avoid   any, transcription errors may persist. If there is any question about the   accuracy of the report or need for clarification, then please call [phone], or text me through perfectserv for clarification or correction. Voice dictation software was used during the making of this note. This software is not perfect and grammatical and other typographical errors may be present. This note has not been completely proofread for errors.

## 2022-05-12 NOTE — ED NOTES
I have reviewed discharge instructions with the patient. The patient verbalized understanding. Patient left ED via Discharge Method: ambulatory to Home with (Spouse). Opportunity for questions and clarification provided. Patient given 5 scripts. To continue your aftercare when you leave the hospital, you may receive an automated call from our care team to check in on how you are doing. This is a free service and part of our promise to provide the best care and service to meet your aftercare needs.  If you have questions, or wish to unsubscribe from this service please call [phone]. Thank you for Choosing our [facility] Emergency Department.

## 2022-05-12 NOTE — DISCHARGE INSTRUCTIONS
Medications as prescribed. Increase fluids and advance diet as tolerated. Avoid milk and dairy products until diarrhea is resolved for 48 hours. Follow-up with [facility]. Call them tomorrow afternoon for follow-up appointment if you do not hear from them in the morning. Return if any new, worsening or concerning symptoms.

## 2022-05-13 ENCOUNTER — HOSPITAL ENCOUNTER (OUTPATIENT)
Age: 37
Setting detail: OBSERVATION
Discharge: HOME | End: 2022-05-13
Attending: HOSPITALIST | Admitting: FAMILY MEDICINE
Payer: COMMERCIAL

## 2022-05-13 VITALS
DIASTOLIC BLOOD PRESSURE: 88 MMHG | OXYGEN SATURATION: 98 % | BODY MASS INDEX: 27.97 KG/M2 | RESPIRATION RATE: 18 BRPM | HEIGHT: 66 IN | WEIGHT: 174 LBS | HEART RATE: 52 BPM | SYSTOLIC BLOOD PRESSURE: 125 MMHG | TEMPERATURE: 98.3 F

## 2022-05-13 VITALS
RESPIRATION RATE: 18 BRPM | OXYGEN SATURATION: 100 % | DIASTOLIC BLOOD PRESSURE: 87 MMHG | TEMPERATURE: 97.7 F | SYSTOLIC BLOOD PRESSURE: 127 MMHG | HEART RATE: 66 BPM

## 2022-05-13 PROCEDURE — G0378 HOSPITAL OBSERVATION PER HR: HCPCS

## 2022-05-13 PROCEDURE — 74011000250 HC RX REV CODE- 250: Performed by: HOSPITALIST

## 2022-05-13 PROCEDURE — 74011250636 HC RX REV CODE- 250/636: Performed by: FAMILY MEDICINE

## 2022-05-13 PROCEDURE — 74011250637 HC RX REV CODE- 250/637: Performed by: FAMILY MEDICINE

## 2022-05-13 PROCEDURE — 74011250636 HC RX REV CODE- 250/636: Performed by: HOSPITALIST

## 2022-05-13 PROCEDURE — 74011250637 HC RX REV CODE- 250/637: Performed by: STUDENT IN AN ORGANIZED HEALTH CARE EDUCATION/TRAINING PROGRAM

## 2022-05-13 PROCEDURE — 93005 ELECTROCARDIOGRAM TRACING: CPT

## 2022-05-13 PROCEDURE — 96376 TX/PRO/DX INJ SAME DRUG ADON: CPT

## 2022-05-13 PROCEDURE — 74011250637 HC RX REV CODE- 250/637: Performed by: HOSPITALIST

## 2022-05-13 PROCEDURE — 94762 N-INVAS EAR/PLS OXIMTRY CONT: CPT

## 2022-05-13 PROCEDURE — 96372 THER/PROPH/DIAG INJ SC/IM: CPT

## 2022-05-13 RX ORDER — CIPROFLOXACIN 2 MG/ML
400 INJECTION, SOLUTION INTRAVENOUS EVERY 12 HOURS
Status: CANCELLED | OUTPATIENT
Start: 2022-05-13

## 2022-05-13 RX ORDER — ENOXAPARIN SODIUM 100 MG/ML
40 INJECTION SUBCUTANEOUS EVERY 24 HOURS
Status: DISCONTINUED | OUTPATIENT
Start: 2022-05-13 | End: 2022-05-13 | Stop reason: HOSPADM

## 2022-05-13 RX ORDER — SODIUM CHLORIDE 9 MG/ML
75 INJECTION, SOLUTION INTRAVENOUS CONTINUOUS
Status: DISCONTINUED | OUTPATIENT
Start: 2022-05-13 | End: 2022-05-13 | Stop reason: CLARIF

## 2022-05-13 RX ORDER — SODIUM CHLORIDE 0.9 % (FLUSH) 0.9 %
5-40 SYRINGE (ML) INJECTION EVERY 8 HOURS
Status: CANCELLED | OUTPATIENT
Start: 2022-05-13

## 2022-05-13 RX ORDER — SODIUM CHLORIDE 0.9 % (FLUSH) 0.9 %
5-40 SYRINGE (ML) INJECTION AS NEEDED
Status: DISCONTINUED | OUTPATIENT
Start: 2022-05-13 | End: 2022-05-13 | Stop reason: HOSPADM

## 2022-05-13 RX ORDER — METRONIDAZOLE 500 MG/1
500 TABLET ORAL 3 TIMES DAILY
Status: DISCONTINUED | OUTPATIENT
Start: 2022-05-13 | End: 2022-05-13 | Stop reason: HOSPADM

## 2022-05-13 RX ORDER — SODIUM CHLORIDE 0.9 % (FLUSH) 0.9 %
5-10 SYRINGE (ML) INJECTION EVERY 8 HOURS
Status: CANCELLED | OUTPATIENT
Start: 2022-05-13

## 2022-05-13 RX ORDER — ONDANSETRON 2 MG/ML
4 INJECTION INTRAMUSCULAR; INTRAVENOUS
Status: DISCONTINUED | OUTPATIENT
Start: 2022-05-13 | End: 2022-05-13 | Stop reason: HOSPADM

## 2022-05-13 RX ORDER — ONDANSETRON 2 MG/ML
4 INJECTION INTRAMUSCULAR; INTRAVENOUS
Status: CANCELLED | OUTPATIENT
Start: 2022-05-13

## 2022-05-13 RX ORDER — POTASSIUM CHLORIDE 20 MEQ/1
40 TABLET, EXTENDED RELEASE ORAL 2 TIMES DAILY
Status: CANCELLED | OUTPATIENT
Start: 2022-05-13 | End: 2022-05-13

## 2022-05-13 RX ORDER — CIPROFLOXACIN 250 MG/1
250 TABLET, FILM COATED ORAL 2 TIMES DAILY
Qty: 10 TABLET | Refills: 0 | Status: SHIPPED | OUTPATIENT
Start: 2022-05-14 | End: 2022-05-19

## 2022-05-13 RX ORDER — UREA 10 %
2 LOTION (ML) TOPICAL 2 TIMES DAILY
Status: CANCELLED | OUTPATIENT
Start: 2022-05-13

## 2022-05-13 RX ORDER — SODIUM CHLORIDE 0.9 % (FLUSH) 0.9 %
5-10 SYRINGE (ML) INJECTION AS NEEDED
Status: DISCONTINUED | OUTPATIENT
Start: 2022-05-13 | End: 2022-05-13 | Stop reason: HOSPADM

## 2022-05-13 RX ORDER — BUTALBITAL, ACETAMINOPHEN AND CAFFEINE 50; 325; 40 MG/1; MG/1; MG/1
1 TABLET ORAL
Status: DISCONTINUED | OUTPATIENT
Start: 2022-05-13 | End: 2022-05-13 | Stop reason: HOSPADM

## 2022-05-13 RX ORDER — SODIUM CHLORIDE 0.9 % (FLUSH) 0.9 %
5-40 SYRINGE (ML) INJECTION EVERY 8 HOURS
Status: DISCONTINUED | OUTPATIENT
Start: 2022-05-13 | End: 2022-05-13 | Stop reason: HOSPADM

## 2022-05-13 RX ORDER — ACETAMINOPHEN 325 MG/1
650 TABLET ORAL
Status: CANCELLED | OUTPATIENT
Start: 2022-05-13

## 2022-05-13 RX ORDER — LOPERAMIDE HYDROCHLORIDE 2 MG/1
2 CAPSULE ORAL
Status: DISCONTINUED | OUTPATIENT
Start: 2022-05-13 | End: 2022-05-13 | Stop reason: HOSPADM

## 2022-05-13 RX ORDER — PANTOPRAZOLE SODIUM 40 MG/1
40 TABLET, DELAYED RELEASE ORAL
Status: DISCONTINUED | OUTPATIENT
Start: 2022-05-13 | End: 2022-05-13 | Stop reason: HOSPADM

## 2022-05-13 RX ORDER — SODIUM CHLORIDE 0.9 % (FLUSH) 0.9 %
5-10 SYRINGE (ML) INJECTION AS NEEDED
Status: CANCELLED | OUTPATIENT
Start: 2022-05-13

## 2022-05-13 RX ORDER — SODIUM CHLORIDE 0.9 % (FLUSH) 0.9 %
5-10 SYRINGE (ML) INJECTION EVERY 8 HOURS
Status: DISCONTINUED | OUTPATIENT
Start: 2022-05-13 | End: 2022-05-13 | Stop reason: HOSPADM

## 2022-05-13 RX ORDER — ACETAMINOPHEN 325 MG/1
650 TABLET ORAL
Status: DISCONTINUED | OUTPATIENT
Start: 2022-05-13 | End: 2022-05-13 | Stop reason: HOSPADM

## 2022-05-13 RX ORDER — POLYETHYLENE GLYCOL 3350 17 G/17G
17 POWDER, FOR SOLUTION ORAL DAILY PRN
Status: DISCONTINUED | OUTPATIENT
Start: 2022-05-13 | End: 2022-05-13 | Stop reason: HOSPADM

## 2022-05-13 RX ORDER — PANTOPRAZOLE SODIUM 40 MG/1
40 TABLET, DELAYED RELEASE ORAL DAILY
Qty: 14 TABLET | Refills: 0 | Status: SHIPPED | OUTPATIENT
Start: 2022-05-13 | End: 2022-05-27

## 2022-05-13 RX ORDER — SODIUM CHLORIDE, SODIUM LACTATE, POTASSIUM CHLORIDE, CALCIUM CHLORIDE 600; 310; 30; 20 MG/100ML; MG/100ML; MG/100ML; MG/100ML
125 INJECTION, SOLUTION INTRAVENOUS CONTINUOUS
Status: CANCELLED | OUTPATIENT
Start: 2022-05-13 | Stop reason: CLARIF

## 2022-05-13 RX ORDER — POTASSIUM CHLORIDE 20 MEQ/1
40 TABLET, EXTENDED RELEASE ORAL 2 TIMES DAILY
Status: COMPLETED | OUTPATIENT
Start: 2022-05-13 | End: 2022-05-13

## 2022-05-13 RX ORDER — ONDANSETRON 4 MG/1
4 TABLET, ORALLY DISINTEGRATING ORAL
Status: DISCONTINUED | OUTPATIENT
Start: 2022-05-13 | End: 2022-05-13 | Stop reason: HOSPADM

## 2022-05-13 RX ORDER — LOPERAMIDE HYDROCHLORIDE 2 MG/1
2 CAPSULE ORAL
Status: CANCELLED | OUTPATIENT
Start: 2022-05-13

## 2022-05-13 RX ORDER — ENOXAPARIN SODIUM 100 MG/ML
40 INJECTION SUBCUTANEOUS DAILY
Status: CANCELLED | OUTPATIENT
Start: 2022-05-13

## 2022-05-13 RX ORDER — PANTOPRAZOLE SODIUM 40 MG/1
40 TABLET, DELAYED RELEASE ORAL
Status: CANCELLED | OUTPATIENT
Start: 2022-05-13

## 2022-05-13 RX ORDER — CIPROFLOXACIN 2 MG/ML
400 INJECTION, SOLUTION INTRAVENOUS EVERY 12 HOURS
Status: DISCONTINUED | OUTPATIENT
Start: 2022-05-13 | End: 2022-05-13 | Stop reason: HOSPADM

## 2022-05-13 RX ORDER — ACETAMINOPHEN 650 MG/1
650 SUPPOSITORY RECTAL
Status: CANCELLED | OUTPATIENT
Start: 2022-05-13

## 2022-05-13 RX ORDER — UREA 10 %
2 LOTION (ML) TOPICAL 2 TIMES DAILY
Status: DISCONTINUED | OUTPATIENT
Start: 2022-05-13 | End: 2022-05-13 | Stop reason: HOSPADM

## 2022-05-13 RX ORDER — METRONIDAZOLE 500 MG/1
500 TABLET ORAL 3 TIMES DAILY
Status: CANCELLED | OUTPATIENT
Start: 2022-05-13

## 2022-05-13 RX ORDER — ONDANSETRON 4 MG/1
4 TABLET, ORALLY DISINTEGRATING ORAL
Status: CANCELLED | OUTPATIENT
Start: 2022-05-13

## 2022-05-13 RX ORDER — METRONIDAZOLE 500 MG/1
500 TABLET ORAL 3 TIMES DAILY
Qty: 15 TABLET | Refills: 0 | Status: SHIPPED | OUTPATIENT
Start: 2022-05-13 | End: 2022-05-18

## 2022-05-13 RX ORDER — POLYETHYLENE GLYCOL 3350 17 G/17G
17 POWDER, FOR SOLUTION ORAL DAILY PRN
Status: CANCELLED | OUTPATIENT
Start: 2022-05-13

## 2022-05-13 RX ORDER — SODIUM CHLORIDE 0.9 % (FLUSH) 0.9 %
5-40 SYRINGE (ML) INJECTION AS NEEDED
Status: CANCELLED | OUTPATIENT
Start: 2022-05-13

## 2022-05-13 RX ORDER — ACETAMINOPHEN 650 MG/1
650 SUPPOSITORY RECTAL
Status: DISCONTINUED | OUTPATIENT
Start: 2022-05-13 | End: 2022-05-13 | Stop reason: HOSPADM

## 2022-05-13 RX ORDER — CIPROFLOXACIN 250 MG/1
250 TABLET, FILM COATED ORAL 2 TIMES DAILY
Qty: 10 TABLET | Refills: 0 | Status: SHIPPED | OUTPATIENT
Start: 2022-05-13 | End: 2022-05-13 | Stop reason: SDUPTHER

## 2022-05-13 RX ADMIN — POTASSIUM CHLORIDE 40 MEQ: 20 TABLET, EXTENDED RELEASE ORAL at 09:46

## 2022-05-13 RX ADMIN — SODIUM CHLORIDE, PRESERVATIVE FREE 10 ML: 5 INJECTION INTRAVENOUS at 06:02

## 2022-05-13 RX ADMIN — SODIUM CHLORIDE, POTASSIUM CHLORIDE, SODIUM LACTATE AND CALCIUM CHLORIDE 125 ML/HR: 600; 310; 30; 20 INJECTION, SOLUTION INTRAVENOUS at 00:50

## 2022-05-13 RX ADMIN — ACETAMINOPHEN 650 MG: 325 TABLET ORAL at 00:29

## 2022-05-13 RX ADMIN — BUTALBITAL, ACETAMINOPHEN, AND CAFFEINE 1 TABLET: 50; 325; 40 TABLET ORAL at 13:33

## 2022-05-13 RX ADMIN — ENOXAPARIN SODIUM 40 MG: 40 INJECTION SUBCUTANEOUS at 09:46

## 2022-05-13 RX ADMIN — METRONIDAZOLE 500 MG: 500 TABLET ORAL at 16:18

## 2022-05-13 RX ADMIN — PANTOPRAZOLE SODIUM 40 MG: 40 TABLET, DELAYED RELEASE ORAL at 09:46

## 2022-05-13 RX ADMIN — CIPROFLOXACIN 400 MG: 2 INJECTION, SOLUTION INTRAVENOUS at 09:47

## 2022-05-13 RX ADMIN — LACTOBACILLUS TAB 2 TABLET: TAB at 03:13

## 2022-05-13 RX ADMIN — METRONIDAZOLE 500 MG: 500 TABLET ORAL at 09:46

## 2022-05-13 NOTE — PROGRESS NOTES
Patient received as an admission via ambulance stretcher from  ER to room 316. Patient A&Ox4. Orientation to room/unit given. Admission database/assessment completed. Patient offers no complaints at this time. Bed in low position, call bell in place. Patient's wife at bedside, will monitor.        05/13/22 0530   Dual Skin Pressure Injury Assessment   Dual Skin Pressure Injury Assessment WDL   Second Care Provider (Based on [address] [place] [address]) Mercedes PUGH   Skin Integumentary   Skin Integumentary (WDL) WDL    Pressure  Injury Documentation No Pressure Injury Noted-Pressure Ulcer Prevention Initiated

## 2022-05-13 NOTE — PROGRESS NOTES
Hospitalist Progress Note   Admit Date:  2022  4:47 AM   Name:  Sheron Cisneros   Age:  39 y.o. Sex:  male  :  1985   MRN:  [id number]   Room:  [facility]/    Presenting Complaint: No chief complaint on file. Reason(s) for Admission: Infectious gastroenteritis and colitis  [facility] Interval History:   49-year-old male with significant past medical history presented with left-sided abdominal pain, sudden onset of nausea vomiting diarrhea for the last 7 days. Patient went to the emergency department on  and CT abdomen pelvis at that time showed left inguinal hernia and 12 mm periesophageal lymph node. Symptoms were thought to be secondary to inguinal hernia and was referred to general surgery. However his nausea and vomiting got worse associated with diarrhea the day after so he came to the emergency department on . Patient denies taking any medications at home. Patient complained of intermittent blood in stools but has not been seen by GI. In the ED CBC and CMP were unremarkable with lipase of 535. C. difficile was negative. Urine studies not concerning for infection. Repeat CT abdomen pelvis showed acute inflammation of small bowel and sigmoid colon could be due to enterocolitis, also persistent enlarged paraesophageal lymph node with small hiatal hernia. Patient received fluids and Cipro Flagyl and was recommended to follow-up with GI outpatient for EGD. However family was concerned for malignancy and requested admission. Subjective/24hr Events (22): Patient was seen and examined at the bedside. No overnight events. Patient this morning states that he feeling much better today than on day of admission. Patient denies any cardiac chest pain, shortness of breath, fever/chills. ROS:  10 systems reviewed and negative except as noted above.      Assessment & Plan:   Infectious gastroenteritis and colitis (2022)  CT findings showed acute inflammation of small bowel and sigmoid colon which could likely be secondary to enterocolitis   Continue antibiotics Cipro Flagyl, started 5/12   Continue probiotics   Stool cultures pending   Maintenance IV fluids   Supportive care   Imodium and antiemetics as needed   Possible EGD and colonoscopy to further evaluate CT scan findings per gastroenterology    Lymphadenopathy   CT abdomen pelvis noted with persistent enlarged paraesophageal lymph node   Patient will likely need repeat CT chest in 3 months for follow-up   GI consulted, appreciate recommendations   GI plans to do outpatient EGD    GERD   Continue PPI    Left inguinal hernia   Patient will need to follow-up with general surgery outpatient as referred    Overweight   Lifestyle modification will be discussed at discharge      Discharge Planning:    Dispo pending clinical course. Diet:  ADULT DIET Clear Liquid  DVT PPx: Lovenox  Code status: Full Code    Hospital Problems as of 5/13/2022 Never Reviewed          Codes Class Noted - Resolved POA    * (Principal) Infectious gastroenteritis and colitis ICD-10-CM: A09  ICD-9-CM: 009.0  5/12/2022 - Present Unknown        Lymphadenopathy ICD-10-CM: R59.1  ICD-9-CM: 785.6  5/12/2022 - Present Yes        New daily persistent headache ICD-10-CM: G44.52  ICD-9-CM: 339.42  11/1/2018 - Present Yes              Objective:     Patient Vitals for the past 24 hrs:   Temp Pulse Resp BP SpO2   05/13/22 1122 98.5 °F (36.9 °C) 79 18 132/83 99 %   05/13/22 0825     99 %   05/13/22 0800 98.3 °F (36.8 °C) 77 18 118/84 97 %   05/13/22 0602     98 %     Oxygen Therapy  O2 Sat (%): 99 % (05/13/22 1122)  Pulse via Oximetry: 94 beats per minute (05/13/22 0825)  O2 Device: None (Room air) (05/13/22 0825)    Estimated body mass index is 28.08 kg/m² as calculated from the following:    Height as of 5/12/22: 5' 6\" (1.676 m). Weight as of 5/12/22: 78.9 kg (174 lb).   No intake or output data in the 24 hours ending 05/13/22 1126      Physical Exam:   Blood pressure 132/83, pulse 79, temperature 98.5 °F (36.9 °C), resp. rate 18, SpO2 99 %. General:    Well nourished. No overt distress  Head:  Normocephalic, atraumatic  Eyes:  Sclerae appear normal.  Pupils equally round. ENT:  Nares appear normal, no drainage. Moist oral mucosa  Neck:  No restricted ROM. Trachea midline   CV:   RRR. No m/r/g. No jugular venous distension. Lungs:   CTAB. No wheezing, rhonchi, or rales. Respirations even, unlabored  Abdomen: Bowel sounds present. Soft, generalized mild tenderness, nondistended. Extremities: No cyanosis or clubbing. No edema  Skin:     No rashes and normal coloration. Warm and dry. Neuro:  CN II-XII grossly intact. Sensation intact. A&Ox3  Psych:  Normal mood and affect. I have reviewed ordered lab tests and independently visualized imaging below:    Recent Labs:  Recent Results (from the past 48 hour(s))   CBC WITH AUTOMATED DIFF    Collection Time: 05/12/22  1:00 PM   Result Value Ref Range    WBC 7.1 4.3 - 11.1 K/uL    RBC 5.77 (H) 4.23 - 5.6 M/uL    HGB 16.1 13.6 - 17.2 g/dL    HCT 48.8 41.1 - 50.3 %    MCV 84.6 79.6 - 97.8 FL    MCH 27.9 26.1 - 32.9 PG    MCHC 33.0 31.4 - 35.0 g/dL    RDW 12.2 11.9 - 14.6 %    PLATELET 983 255 - 190 K/uL    MPV 10.0 9.4 - 12.3 FL    ABSOLUTE NRBC 0.00 0.0 - 0.2 K/uL    DF AUTOMATED      NEUTROPHILS 59 43 - 78 %    LYMPHOCYTES 30 13 - 44 %    MONOCYTES 6 4.0 - 12.0 %    EOSINOPHILS 3 0.5 - 7.8 %    BASOPHILS 1 0.0 - 2.0 %    IMMATURE GRANULOCYTES 1 0.0 - 5.0 %    ABS. NEUTROPHILS 4.2 1.7 - 8.2 K/UL    ABS. LYMPHOCYTES 2.1 0.5 - 4.6 K/UL    ABS. MONOCYTES 0.5 0.1 - 1.3 K/UL    ABS. EOSINOPHILS 0.2 0.0 - 0.8 K/UL    ABS. BASOPHILS 0.1 0.0 - 0.2 K/UL    ABS. IMM.  GRANS. 0.0 0.0 - 0.5 K/UL   METABOLIC PANEL, COMPREHENSIVE    Collection Time: 05/12/22  1:00 PM   Result Value Ref Range    Sodium 141 138 - 145 mmol/L    Potassium 3.3 (L) 3.5 - 5.1 mmol/L    Chloride 107 98 - 107 mmol/L    CO2 28 21 - 32 mmol/L    Anion gap 6 (L) 7 - 16 mmol/L    Glucose 117 (H) 65 - 100 mg/dL    BUN 10 6 - 23 MG/DL    Creatinine 1.00 0.8 - 1.5 MG/DL    GFR est AA >60 >60 ml/min/1.73m2    GFR est non-AA >60 >60 ml/min/1.73m2    Calcium 8.7 8.3 - 10.4 MG/DL    Bilirubin, total 0.2 0.2 - 1.1 MG/DL    ALT (SGPT) 43 12 - 65 U/L    AST (SGOT) 20 15 - 37 U/L    Alk. phosphatase 80 50 - 136 U/L    Protein, total 7.0 6.3 - 8.2 g/dL    Albumin 3.7 3.5 - 5.0 g/dL    Globulin 3.3 2.3 - 3.5 g/dL    A-G Ratio 1.1 (L) 1.2 - 3.5     MAGNESIUM    Collection Time: 05/12/22  1:00 PM   Result Value Ref Range    Magnesium 2.3 1.8 - 2.4 mg/dL   LIPASE    Collection Time: 05/12/22  1:00 PM   Result Value Ref Range    Lipase 535 (H) 73 - 393 U/L   CULTURE, STOOL    Collection Time: 05/12/22  4:22 PM    Specimen: Stool   Result Value Ref Range    Special Requests: NO SPECIAL REQUESTS      Culture result:        NO GROWTH OF SALMONELLA OR SHIGELLA IS EVIDENT ON FIRST READING, FINAL REPORT TO FOLLOW AFTER FURTHER INCUBATION OF CULTURE   C. DIFFICILE/EPI PCR    Collection Time: 05/12/22  4:22 PM    Specimen: Stool   Result Value Ref Range    Special Requests: NO SPECIAL REQUESTS      Culture result:        Toxigenic C. difficile NEGATIVE                         C. difficile target DNA sequences are not detected.    POC URINE MACROSCOPIC    Collection Time: 05/12/22  4:26 PM   Result Value Ref Range    Spec. gravity (POC) 1.015 1.001 - 1.023      pH, urine  (POC) 7.0 5.0 - 9.0      Protein (POC) Negative NEG mg/dL    Glucose, urine (POC) Negative NEG mg/dL    Ketones (POC) Negative NEG mg/dL    Bilirubin (POC) Negative NEG      Blood (POC) Negative NEG      Urobilinogen (POC) 0.2 0.2 - 1.0 EU/dL    Nitrite (POC) Negative NEG      Leukocyte esterase (POC) Negative NEG      Performed by Coco Ireland        All Micro Results     None          Other Studies:  CT ABD PELV W CONT    Result Date: 5/12/2022  CT ABDOMEN AND PELVIS WITH INTRAVENOUS CONTRAST DATED 5/12/2022. History: Abdominal pain, nausea, vomiting, and diarrhea. Comparison: CT abdomen and pelvis with contrast 5/7/2022 Technique:   Multiple contiguous helical CT images reconstructed at 5 mm intervals were obtained from above the diaphragms through the ischial tuberosities following oral and 100 cc Isovue-370 without acute complication. All CT scans performed at this facility use one or all of the following: Automated exposure control, adjustment of the mA and/or kVp according to patient's size, iterative reconstruction. Findings: CT ABDOMEN:  Limited evaluation of the lung bases and base of the mediastinum demonstrates a persistent enlarged paraesophageal lymph node which now measures 13 mm in size. A small hiatal hernia is once again seen. The distal esophagus just proximal to this level does appear thick-walled. The Liver is homogeneous in attenuation. The spleen is homogeneous in attenuation. No contour deforming or enhancing mass lesions are seen of the pancreas or adrenal glands. The gallbladder has an unremarkable CT appearance without radiopaque stones or pericholecystic fluid/inflammatory changes. The kidneys enhance symmetrically and no evidence of hydronephrosis is seen. A stable subcentimeter hypodense intracortical lesion is seen in the posterior mid pole cortex of the right kidney measuring 6 mm in size. This is too small to characterize although does not demonstrate clearly suspicious enhancement. The visualized loops of small bowel and colon are normal in caliber. Small bowel wall thickening is seen of more proximal small bowel loops in the left abdomen. This is a nonspecific finding although can been indicator of acute small bowel inflammation (i.e. enteritis). The appendix is seen on axial image 58 without acute abnormality.  Mild mesenteric edema is now seen adjacent to the proximal sigmoid colon on axial image 75 although no acute inflammatory wall changes are seen. No free fluid, free air, or focal inflammatory changes are seen in the abdomen. No adenopathy is seen. The abdominal aorta is unremarkable in appearance. CT PELVIS: No abnormal pelvic fluid collections or inflammatory changes are present. No pelvic adenopathy is seen. The urinary bladder is unremarkable. A small fat-containing left inguinal hernia is seen. 1.  Thick-walled small bowel loops. Although nonspecific, these can indicate acute inflammation. In addition, new mild stranding is seen adjacent to the proximal sigmoid colon which could indicate an additional limited colitis. Recommend correlation for signs/symptoms of potential enterocolitis. 2. Persistent enlarged paraesophageal mediastinal lymph node which is incompletely characterized. There does appear to be a small hiatal hernia. In addition, some wall thickening of the partially visualized distal esophagus is seen. The lymph node could be related to these esophageal wall changes. The esophagus would be best further assessed with endoscopy. The lymph node can be further assessed with PET imaging. If not performed, then a follow-up CT chest no later than 3 months from now should be performed to ensure subsequent stability or improvement. This report was made using voice transcription. Despite my best efforts to avoid any, transcription errors may persist. If there is any question about the accuracy of the report or need for clarification, then please call [phone], or text me through [email or web address] for clarification or correction.        Current Meds:  Current Facility-Administered Medications   Medication Dose Route Frequency    [Held by provider] acetaminophen (TYLENOL) tablet 650 mg  650 mg Oral Q6H PRN    Or    [Held by provider] acetaminophen (TYLENOL) suppository 650 mg  650 mg Rectal Q6H PRN    enoxaparin (LOVENOX) injection 40 mg  40 mg SubCUTAneous Q24H    ondansetron (ZOFRAN ODT) tablet 4 mg  4 mg Oral Q8H PRN    Or  ondansetron (ZOFRAN) injection 4 mg  4 mg IntraVENous Q6H PRN    polyethylene glycol (MIRALAX) packet 17 g  17 g Oral DAILY PRN    sodium chloride (NS) flush 5-40 mL  5-40 mL IntraVENous Q8H    sodium chloride (NS) flush 5-40 mL  5-40 mL IntraVENous PRN    ciprofloxacin (CIPRO) 400 mg in D5W IVPB (premix)  400 mg IntraVENous Q12H    Lactobacillus Acidoph & Bulgar (FLORANEX) tablet 2 Tablet  2 Tablet Oral BID    loperamide (IMODIUM) capsule 2 mg  2 mg Oral Q4H PRN    metroNIDAZOLE (FLAGYL) tablet 500 mg  500 mg Oral TID    pantoprazole (PROTONIX) tablet 40 mg  40 mg Oral ACB    sodium chloride (NS) flush 5-10 mL  5-10 mL IntraVENous Q8H    sodium chloride (NS) flush 5-10 mL  5-10 mL IntraVENous PRN    butalbital-acetaminophen-caffeine (FIORICET, ESGIC) -40 mg per tablet 1 Tablet  1 Tablet Oral Q6H PRN       Signed:  Cash Mcguire MD    Part of this note may have been written by using a voice dictation software. The note has been proof read but may still contain some grammatical/other typographical errors.

## 2022-05-13 NOTE — PROGRESS NOTES
TRANSFER - OUT REPORT:    Verbal report given to Yamilka Ruby  being transferred to ES room 316 for routine progression of care       Report consisted of patients Situation, Background, Assessment and   Recommendations(SBAR). Information from the following report(s) SBAR was reviewed with the receiving nurse. Lines:   Peripheral IV 05/12/22 Anterior;Right Hand (Active)        Opportunity for questions and clarification was provided.       Patient transported with:   {TRANSPORTDETAILS:[id number]}

## 2022-05-13 NOTE — DISCHARGE INSTRUCTIONS
Follow-up with your primary care provider within the next 4 to 5 days discussed recent hospitalization any changes made to her medication.  Please follow-up with your gastroenterologist on Monday to undergo EGD.

## 2022-05-13 NOTE — H&P
Hospitalist History and Physical   Admit Date:  2022  3:01 PM   Name:  Leann Del Toro   Age:  39 y.o. Sex:  male  :  1985   MRN:  [id number]   Room:  ER15/15    Presenting Complaint: Diarrhea and Vomiting    Reason(s) for Admission: Infectious gastroenteritis and colitis [A09]     History of Present Illness:   Leann Del Toro is a 39 y.o. male with significant past medical history who presented with left-sided abdominal pain, sudden onset associated with nausea, vomiting and diarrhea of 7-day duration that has gotten worse. He went to ER on . CT AP at that time shows left inguinal hernia and 12 mm paraesophageal lymph node. His symptoms were thought to be secondary to inguinal hernia and who was referred to general surgery. However, his nausea and vomiting got worse associated with diarrhea the day after so came to the ED . He denies taking any medications at home. Nobody at home experiencing similar symptoms. Wife at bedside reported he has intermittent blood in stools but has not been seen by GI. Patient denies alcohol use or illicit drug use. On admission, he denies fever, chills, SOB, chest pain. In ED, VSS. CBC and CMP unremarkable. Lipase 535. C. difficile negative. Urine studies not concerning for infection. Repeat CTAP shows acute inflammation of small bowel and sigmoid colon could be due to enterocolitis; also persistent enlarged paraesophageal lymph node with small hiatal hernia. Received IVF and Cipro/Flagyl and was recommended to follow-up with GI outpatient for EGD; however family was concerned for malignancy and requested admission as she has concerns he will not follow-up as instructed. Review of Systems:  10 systems reviewed and negative except as noted in HPI. Assessment & Plan:     Infectious gastroenteritis and colitis  CT findings noted. C. difficile negative in ER  ABX: Cipro/Flagyl (-. ..)  Probiotics added  Stool cultures: Pending  MIVF  Supportive care  Antiemetics and Imodium as needed    Lymphadenopathy  CTAP findings noted with persistent enlarged paraesophageal lymph node. May need repeat CT chest in 3 months for follow-up. GI plans to do outpatient EGD  Consult GI, appreciate recs    GERD  Continue PPI    Left inguinal hernia  Follow-up general surgery outpatient as referred    Overweight  Lifestyle modification      Dispo/Discharge Plannin-2 nights    Diet: No diet orders on file  VTE ppx: Lovenox SQ  Code status: No Order    Hospital Problems as of 2022 Never Reviewed          Codes Class Noted - Resolved POA    Infectious gastroenteritis and colitis ICD-10-CM: A09  ICD-9-CM: 009.0  2022 - Present Unknown              Past History:  Past Medical History:   Diagnosis Date    GERD (gastroesophageal reflux disease)      No past surgical history on file. No Known Allergies   Social History     Tobacco Use    Smoking status: Never Smoker    Smokeless tobacco: Never Used   Substance Use Topics    Alcohol use: No      Family History   Problem Relation Age of Onset    Hypertension Mother     Cancer Mother     No Known Problems Father     Cancer Maternal Grandfather       Family history reviewed and negative except as noted above. There is no immunization history on file for this patient.   Prior to Admit Medications:  Current Outpatient Medications   Medication Instructions    ciprofloxacin HCl (CIPRO) 500 mg, Oral, 2 TIMES DAILY    hyoscyamine SL (LEVSIN/SL) 0.125 mg, SubLINGual, EVERY 4 HOURS AS NEEDED    metroNIDAZOLE (FLAGYL) 500 mg, Oral, 3 TIMES DAILY    ondansetron (ZOFRAN ODT) 8 mg, Oral, EVERY 8 HOURS AS NEEDED    ondansetron hcl (ZOFRAN) 4 mg, Oral, EVERY 8 HOURS AS NEEDED    pantoprazole (PROTONIX) 40 mg, Oral, DAILY    promethazine (PHENERGAN) 25 mg, Oral, EVERY 6 HOURS AS NEEDED    raNITIdine (ZANTAC) 150 mg, Oral, 2 TIMES DAILY       Objective:     Patient Vitals for the past 24 hrs:   Temp Pulse Resp BP SpO2   05/12/22 1735 -- -- -- (!) 139/104 99 %   05/12/22 1720 -- -- -- (!) 132/93 --   05/12/22 1705 -- -- -- (!) 124/98 99 %   05/12/22 1650 -- -- -- (!) 131/100 100 %   05/12/22 1635 -- -- -- (!) 138/95 100 %   05/12/22 1620 -- -- -- (!) 140/96 --   05/12/22 1535 -- -- -- (!) 139/99 97 %   05/12/22 1520 -- -- -- (!) 138/102 96 %   05/12/22 1255 98.6 °F (37 °C) 88 18 (!) 145/91 98 %     Oxygen Therapy  O2 Sat (%): 99 % (05/12/22 1735)  Pulse via Oximetry: 80 beats per minute (05/12/22 1735)  O2 Device: None (Room air) (05/12/22 1255)    Estimated body mass index is 28.08 kg/m² as calculated from the following:    Height as of this encounter: 5' 6\" (1.676 m). Weight as of this encounter: 78.9 kg (174 lb). No intake or output data in the 24 hours ending 05/12/22 2101      Physical Exam:    Blood pressure (!) 139/104, pulse 88, temperature 98.6 °F (37 °C), resp. rate 18, height 5' 6\" (1.676 m), weight 78.9 kg (174 lb), SpO2 99 %. General:    Fatigued appearing. Patient alert and oriented x3  Head:  Normocephalic, atraumatic  Eyes:  Sclerae appear normal.  Pupils equally round. ENT:  Nares appear normal, no drainage. Moist oral mucosa  Neck:  No restricted ROM. Trachea midline   CV:   RRR. No m/r/g. No jugular venous distension. Lungs:   CTAB. No wheezing, rhonchi, or rales. Respirations even, unlabored  Abdomen: Bowel sounds present. Soft, nondistended. TTP diffusely without rebound or guarding  Extremities: No cyanosis or clubbing. No edema  Skin:     No rashes and normal coloration. Warm and dry. Neuro:  CN II-XII grossly intact. Sensation intact. A&Ox3  Psych:  Normal mood and affect.       I have reviewed ordered lab tests and independently visualized imaging below:    Last 24hr Labs:  Recent Results (from the past 24 hour(s))   CBC WITH AUTOMATED DIFF    Collection Time: 05/12/22  1:00 PM   Result Value Ref Range    WBC 7.1 4.3 - 11.1 K/uL    RBC 5.77 (H) 4.23 - 5.6 M/uL    HGB 16.1 13.6 - 17.2 g/dL    HCT 48.8 41.1 - 50.3 %    MCV 84.6 79.6 - 97.8 FL    MCH 27.9 26.1 - 32.9 PG    MCHC 33.0 31.4 - 35.0 g/dL    RDW 12.2 11.9 - 14.6 %    PLATELET 313 482 - 839 K/uL    MPV 10.0 9.4 - 12.3 FL    ABSOLUTE NRBC 0.00 0.0 - 0.2 K/uL    DF AUTOMATED      NEUTROPHILS 59 43 - 78 %    LYMPHOCYTES 30 13 - 44 %    MONOCYTES 6 4.0 - 12.0 %    EOSINOPHILS 3 0.5 - 7.8 %    BASOPHILS 1 0.0 - 2.0 %    IMMATURE GRANULOCYTES 1 0.0 - 5.0 %    ABS. NEUTROPHILS 4.2 1.7 - 8.2 K/UL    ABS. LYMPHOCYTES 2.1 0.5 - 4.6 K/UL    ABS. MONOCYTES 0.5 0.1 - 1.3 K/UL    ABS. EOSINOPHILS 0.2 0.0 - 0.8 K/UL    ABS. BASOPHILS 0.1 0.0 - 0.2 K/UL    ABS. IMM. GRANS. 0.0 0.0 - 0.5 K/UL   METABOLIC PANEL, COMPREHENSIVE    Collection Time: 05/12/22  1:00 PM   Result Value Ref Range    Sodium 141 138 - 145 mmol/L    Potassium 3.3 (L) 3.5 - 5.1 mmol/L    Chloride 107 98 - 107 mmol/L    CO2 28 21 - 32 mmol/L    Anion gap 6 (L) 7 - 16 mmol/L    Glucose 117 (H) 65 - 100 mg/dL    BUN 10 6 - 23 MG/DL    Creatinine 1.00 0.8 - 1.5 MG/DL    GFR est AA >60 >60 ml/min/1.73m2    GFR est non-AA >60 >60 ml/min/1.73m2    Calcium 8.7 8.3 - 10.4 MG/DL    Bilirubin, total 0.2 0.2 - 1.1 MG/DL    ALT (SGPT) 43 12 - 65 U/L    AST (SGOT) 20 15 - 37 U/L    Alk. phosphatase 80 50 - 136 U/L    Protein, total 7.0 6.3 - 8.2 g/dL    Albumin 3.7 3.5 - 5.0 g/dL    Globulin 3.3 2.3 - 3.5 g/dL    A-G Ratio 1.1 (L) 1.2 - 3.5     MAGNESIUM    Collection Time: 05/12/22  1:00 PM   Result Value Ref Range    Magnesium 2.3 1.8 - 2.4 mg/dL   LIPASE    Collection Time: 05/12/22  1:00 PM   Result Value Ref Range    Lipase 535 (H) 73 - 393 U/L   C. DIFFICILE/EPI PCR    Collection Time: 05/12/22  4:22 PM    Specimen: Stool   Result Value Ref Range    Special Requests: NO SPECIAL REQUESTS      Culture result:        Toxigenic C. difficile NEGATIVE                         C. difficile target DNA sequences are not detected.    [facility] Time: 05/12/22  4:26 PM   Result Value Ref Range    Spec. gravity (POC) 1.015 1.001 - 1.023      pH, urine  (POC) 7.0 5.0 - 9.0      Protein (POC) Negative NEG mg/dL    Glucose, urine (POC) Negative NEG mg/dL    Ketones (POC) Negative NEG mg/dL    Bilirubin (POC) Negative NEG      Blood (POC) Negative NEG      Urobilinogen (POC) 0.2 0.2 - 1.0 EU/dL    Nitrite (POC) Negative NEG      Leukocyte esterase (POC) Negative NEG      Performed by Sergio Cornelius        All Micro Results     Procedure Component Value Units Date/Time    C. DIFFICILE/EPI PCR [[id number]] Collected: 05/12/22 1622    Order Status: Completed Specimen: Stool Updated: 05/12/22 1386     Special Requests: NO SPECIAL REQUESTS        Culture result:       Toxigenic C. difficile NEGATIVE                         C. difficile target DNA sequences are not detected. CULTURE, STOOL [[id number]] Collected: 05/12/22 1622    Order Status: Completed Specimen: Stool Updated: 05/12/22 1083          Other Studies:  CT ABD PELV W CONT    Result Date: 5/12/2022  CT ABDOMEN AND PELVIS WITH INTRAVENOUS CONTRAST DATED 5/12/2022. History: Abdominal pain, nausea, vomiting, and diarrhea. Comparison: CT abdomen and pelvis with contrast 5/7/2022 Technique:   Multiple contiguous helical CT images reconstructed at 5 mm intervals were obtained from above the diaphragms through the ischial tuberosities following oral and 100 cc Isovue-370 without acute complication. All CT scans performed at this facility use one or all of the following: Automated exposure control, adjustment of the mA and/or kVp according to patient's size, iterative reconstruction. Findings: CT ABDOMEN:  Limited evaluation of the lung bases and base of the mediastinum demonstrates a persistent enlarged paraesophageal lymph node which now measures 13 mm in size. A small hiatal hernia is once again seen. The distal esophagus just proximal to this level does appear thick-walled.  The Liver is homogeneous in attenuation. The spleen is homogeneous in attenuation. No contour deforming or enhancing mass lesions are seen of the pancreas or adrenal glands. The gallbladder has an unremarkable CT appearance without radiopaque stones or pericholecystic fluid/inflammatory changes. The kidneys enhance symmetrically and no evidence of hydronephrosis is seen. A stable subcentimeter hypodense intracortical lesion is seen in the posterior mid pole cortex of the right kidney measuring 6 mm in size. This is too small to characterize although does not demonstrate clearly suspicious enhancement. The visualized loops of small bowel and colon are normal in caliber. Small bowel wall thickening is seen of more proximal small bowel loops in the left abdomen. This is a nonspecific finding although can been indicator of acute small bowel inflammation (i.e. enteritis). The appendix is seen on axial image 58 without acute abnormality. Mild mesenteric edema is now seen adjacent to the proximal sigmoid colon on axial image 75 although no acute inflammatory wall changes are seen. No free fluid, free air, or focal inflammatory changes are seen in the abdomen. No adenopathy is seen. The abdominal aorta is unremarkable in appearance. CT PELVIS: No abnormal pelvic fluid collections or inflammatory changes are present. No pelvic adenopathy is seen. The urinary bladder is unremarkable. A small fat-containing left inguinal hernia is seen. 1.  Thick-walled small bowel loops. Although nonspecific, these can indicate acute inflammation. In addition, new mild stranding is seen adjacent to the proximal sigmoid colon which could indicate an additional limited colitis. Recommend correlation for signs/symptoms of potential enterocolitis. 2. Persistent enlarged paraesophageal mediastinal lymph node which is incompletely characterized. There does appear to be a small hiatal hernia.  In addition, some wall thickening of the partially visualized distal esophagus is seen. The lymph node could be related to these esophageal wall changes. The esophagus would be best further assessed with endoscopy. The lymph node can be further assessed with PET imaging. If not performed, then a follow-up CT chest no later than 3 months from now should be performed to ensure subsequent stability or improvement. This report was made using voice transcription. Despite my best efforts to avoid any, transcription errors may persist. If there is any question about the accuracy of the report or need for clarification, then please call [phone], or text me through perfectserv for clarification or correction. Medications Administered     0.9% sodium chloride infusion 1,000 mL     Admin Date  05/12/2022 Action  New Bag Dose  1,000 mL Rate  1,000 mL/hr Route  IntraVENous Administered By  Renny Mack RN          acetaminophen (TYLENOL) tablet 1,000 mg     Admin Date  05/12/2022 Action  Given Dose  1,000 mg Route  Oral Administered By  Renny Mack RN          hyoscyamine SL (LEVSIN/SL) tablet 0.25 mg     Admin Date  05/12/2022 Action  Given Dose  0.25 mg Route  SubLINGual Administered By  Renny Mack RN          iopamidoL (ISOVUE-370) 76 % injection 100 mL     Admin Date  05/12/2022 Action  Given Dose  100 mL Route  IntraVENous Administered By  Bronwyn Cure          ondansetron [facility]) injection 4 mg     Admin Date  05/12/2022 Action  Given Dose  4 mg Route  IntraVENous Administered By  Renny Mack RN          saline peripheral flush soln 10 mL     Admin Date  05/12/2022 Action  Given Dose  10 mL Route  InterCATHeter Administered By  Bronwyn Cure          sodium chloride 0.9 % bolus infusion 100 mL     Admin Date  05/12/2022 Action  New Bag Dose  100 mL Route  IntraVENous Administered By  Bronwyn Cure                Signed: Bertha Pond DO    Part of this note may have been written by using a voice dictation software.   The note has been proof read but may still contain some grammatical/other typographical errors.

## 2022-05-13 NOTE — PROGRESS NOTES
Verbal report given to Maggie Babin on Dasha Hora for transfer to ES room 316 for routine progression of care. SBAR given with the opportunity for questions. Pt transported by [facility]. Wife was at bedside and will be following pt/ambulance to .

## 2022-05-13 NOTE — PROGRESS NOTES
05/13/22 0602   Oxygen Therapy   O2 Sat (%) 98 %   Pulse via Oximetry 84 beats per minute   O2 Device None (Room air)     Continuous sat monitor ordered by Dr. Tae Almeida. Alarm limits set per protocol. SpO2 98%. Pt sleeping. NAD.

## 2022-05-13 NOTE — CONSULTS
[facility] Consult Note       Primary GI Physician: Terrell Landeros, Dr. Tata Lindquist    Referring Provider:  Dr. Letitia Ellison Date:  5/13/2022    Admit Date:  5/13/2022    Chief Complaint:  Enteritis, colitis. Subjective:     History of Present Illness:  Patient is a 39 y.o. male with no significant PMH who is seen in consultation at the request of Dr. John Garcia for enteritis and colitis. Pt presented to the ED today with a 7 day hx of N/V and diarrhea now with new left sided abdominal pain. He has no sick contacts, no travel, and no medication changes. He denies fever, chills. Denies alcohol or illicit drug use. He has had intermittent BRBPR but has had no prior GI evaluation. He was seen in the ED 5/7/22 and  CT AP at that time showed left inguinal hernia and 12 mm paraesophageal lymph node. His symptoms were thought to be secondary to inguinal hernia and who was referred to general surgery. However, his nausea and vomiting got worse associated with diarrhea the day after so came to the ED 5/12/22 where CBC / CMP were unremarkable. Lipase 535. C. difficile negative. Urine studies not concerning for infection. Repeat CTAP shows acute inflammation of small bowel and sigmoid colon could be due to enterocolitis; also persistent enlarged paraesophageal lymph node with small hiatal hernia. Received IVF and Cipro/Flagyl and was recommended to follow-up with GI outpatient for EGD; however family requested admission due to concern for possible malignancy. Patient's wife is at the bedside and provides most history. Patient has had a longstanding hx of GERD. He had an EGD at age 16 for GERD. He was on Nexium for quite some time with good control of GERD symptoms. However, ran out of the medication and has not been to the MD for Rx. He has been chewing tums throughout the day, recently, for GERD. His wife reports that Mr. Shari Wren has always vomited \"a lot\". In the past week, he has vomited daily.  Denies any hematemesis or coffee ground emesis. Developed right sided pain and heard a pop and went to the ER for the first time on 5/7. He has had diarrhea since that time. Reports having 3-4 loose stools daily along with nocturnal stools. Denies any melena/hematochezia. Typical bowel pattern is reported as being 1 formed brown stool daily until recent symptoms started. Patient's wife reports that he has lost about 10 pounds in the past month. He reports having a \"great appetite\". He has never had a colonoscopy.        His 9year old daughter has been diagnosed with EOE and Celiac disease. PMH:  Past Medical History:   Diagnosis Date    GERD (gastroesophageal reflux disease)        PSH:  No past surgical history on file. Allergies:  No Known Allergies    Home Medications:  Prior to Admission medications    Medication Sig Start Date End Date Taking? Authorizing Provider   promethazine (PHENERGAN) 25 mg tablet Take 1 Tablet by mouth every six (6) hours as needed for Nausea. 5/12/22  Yes Clotilde Cantu MD   ondansetron hcl (Zofran) 4 mg tablet Take 1 Tablet by mouth every eight (8) hours as needed for Nausea or Vomiting. 5/7/22  Yes Darian Bess MD   ciprofloxacin HCl (Cipro) 500 mg tablet Take 1 Tablet by mouth two (2) times a day for 10 days. Patient not taking: Reported on 5/13/2022 5/12/22 5/22/22  Clotilde Cantu MD   metroNIDAZOLE (FlagyL) 500 mg tablet Take 1 Tablet by mouth three (3) times daily for 10 days. Patient not taking: Reported on 5/13/2022 5/12/22 5/22/22  Clotilde Cantu MD   ondansetron (ZOFRAN ODT) 8 mg disintegrating tablet Take 1 Tablet by mouth every eight (8) hours as needed for Nausea. Patient not taking: Reported on 5/13/2022 5/12/22   Clotilde Cantu MD   hyoscyamine SL (LEVSIN/SL) 0.125 mg SL tablet 1 Tablet by SubLINGual route every four (4) hours as needed for Cramping.   Patient not taking: Reported on 5/13/2022 5/12/22   Clotilde Cantu MD   pantoprazole (Mayra Prazeres 26) 40 mg tablet Take 1 Tab by mouth daily. Patient not taking: Reported on 5/13/2022 11/1/18   Rizwana Coronel NP   raNITIdine (ZANTAC) 150 mg tablet Take 1 Tab by mouth two (2) times a day. Patient not taking: Reported on 5/13/2022 11/1/18   Rizwana Coronel NP       Hospital Medications:  Current Facility-Administered Medications   Medication Dose Route Frequency    acetaminophen (TYLENOL) tablet 650 mg  650 mg Oral Q6H PRN    Or    acetaminophen (TYLENOL) suppository 650 mg  650 mg Rectal Q6H PRN    enoxaparin (LOVENOX) injection 40 mg  40 mg SubCUTAneous Q24H    ondansetron (ZOFRAN ODT) tablet 4 mg  4 mg Oral Q8H PRN    Or    ondansetron (ZOFRAN) injection 4 mg  4 mg IntraVENous Q6H PRN    polyethylene glycol (MIRALAX) packet 17 g  17 g Oral DAILY PRN    sodium chloride (NS) flush 5-40 mL  5-40 mL IntraVENous Q8H    sodium chloride (NS) flush 5-40 mL  5-40 mL IntraVENous PRN    ciprofloxacin (CIPRO) 400 mg in D5W IVPB (premix)  400 mg IntraVENous Q12H    Lactobacillus Acidoph & Bulgar (FLORANEX) tablet 2 Tablet  2 Tablet Oral BID    loperamide (IMODIUM) capsule 2 mg  2 mg Oral Q4H PRN    metroNIDAZOLE (FLAGYL) tablet 500 mg  500 mg Oral TID    pantoprazole (PROTONIX) tablet 40 mg  40 mg Oral ACB    potassium chloride (K-DUR, KLOR-CON M20) SR tablet 40 mEq  40 mEq Oral BID    sodium chloride (NS) flush 5-10 mL  5-10 mL IntraVENous Q8H    sodium chloride (NS) flush 5-10 mL  5-10 mL IntraVENous PRN       Social History:  Social History     Tobacco Use    Smoking status: Never Smoker    Smokeless tobacco: Never Used   Substance Use Topics    Alcohol use: No       Pt denies any history of drug use, blood transfusions, or tattoos.     Family History:  Family History   Problem Relation Age of Onset    Hypertension Mother     Cancer Mother     No Known Problems Father     Cancer Maternal Grandfather        Review of Systems:  A detailed 10 system ROS is obtained, with pertinent positives as listed above. All others are negative. Diet:  Clear liquid diet    Objective:     Physical Exam:  Vitals:  Visit Vitals  /84   Pulse 77   Temp 98.3 °F (36.8 °C)   Resp 18   SpO2 99%     Gen:  Pt is alert, cooperative, no acute distress  Skin:  Extremities and face reveal no rashes. HEENT: Sclerae anicteric. Extra-occular muscles are intact. No oral ulcers. No abnormal pigmentation of the lips. The neck is supple. Cardiovascular: Regular rate and rhythm. No murmurs, gallops, or rubs. Respiratory:  Comfortable breathing with no accessory muscle use. Clear breath sounds anteriorly with no wheezes, rales, or rhonchi. GI:  Abdomen nondistended, soft, and Tender to palpation across the lower abdomen. Normal active bowel sounds. No enlargement of the liver or spleen. No masses palpable. Rectal:  Deferred  Musculoskeletal:  No pitting edema of the lower legs. Neurological:  Gross memory appears intact. Patient is alert and oriented. Psychiatric:  Mood appears appropriate with judgement intact. Lymphatic:  No cervical or supraclavicular adenopathy. Laboratory:    Recent Labs     05/12/22  1300   WBC 7.1   HGB 16.1   HCT 48.8      MCV 84.6      K 3.3*      CO2 28   BUN 10   CREA 1.00   CA 8.7   MG 2.3   *   AP 80   AST 20   ALT 43   TBILI 0.2   ALB 3.7   TP 7.0   LPSE 535*       Ref Range & Units 5/12/22 1622   Special Requests:   NO SPECIAL REQUESTS P    Culture result:   NO GROWTH OF SALMONELLA OR SHIGELLA IS EVIDENT ON FIRST READING, FINAL REPORT TO FOLLOW AFTER FURTHER INCUBATION OF CULTURE         Ref Range & Units 5/12/22 1622   Special Requests:   NO SPECIAL REQUESTS    Culture result:   Toxigenic C. difficile NEGATIVE                         C. difficile target DNA sequences are not detected.          Assessment:     Active Problems:    Infectious gastroenteritis and colitis (5/12/2022)     39 y.o. male with no significant PMH who is seen in consultation at the request of Dr. Tristin Thomas for acute onset N/V, diarrhea, and left sided abdominal pain without fever and CT evidence of enteritis and colitis; 12 mm paraesophageal lymph node. . Also has inguinal hernia for which he was referred to general surgery after his ER visit for similar sx on 5/7/22. Labs today this admission are unremarkable and Cdiff and stool culture negative . Currently, being treated with Cipro/Flagyl. Patient has had lifelong GERD symptoms and had an EGD at age 16. He was on Nexium for years but ran out and has not gotten another Rx. He has been taking TUMS daily. Has never had a colonoscopy. Typical bowel pattern is 1 formed stool daily. Has had watery stools 3-4 times daily as well as nocturnal stools for 7-10 days. Etiology of colitis is likely infectious given the abrupt onset of symptoms. However, IBD is in the differential.   Plan:     - continue Cipro/Flagyl  - continue Protonix  - continue probiotics  - supportive care  - Will need eventual EGD and Colonoscopy to further evaluate the CT scan findings. Timing of this will be determined based on clinical course. Kang Graves NP      Patient is seen and examined in collaboration with Dr. Florencia Irby. Assessment and plan as per Dr. Florencia Irby.

## 2022-05-13 NOTE — DISCHARGE SUMMARY
Hospitalist Discharge Summary   Admit Date:  2022  4:47 AM   DC Note date: 2022  Name:  Sonido Smith   Age:  39 y.o. Sex:  male  :  1985   MRN:  [id number]   Room:  [facility]  PCP:  Nora Aase, DO    Presenting Complaint: No chief complaint on file. Initial Admission Diagnosis: Infectious gastroenteritis and colitis [A09]     Problem List for this Hospitalization:  Hospital Problems as of 2022 Never Reviewed          Codes Class Noted - Resolved POA    * (Principal) Infectious gastroenteritis and colitis ICD-10-CM: A09  ICD-9-CM: 009.0  2022 - Present Unknown        Lymphadenopathy ICD-10-CM: R59.1  ICD-9-CM: 785.6  2022 - Present Yes        New daily persistent headache ICD-10-CM: G44.52  ICD-9-CM: 339.42  2018 - Present Yes            Did Patient have Sepsis (YES OR NO):     Hospital Course:  45-year-old male with significant past medical history presented with left-sided abdominal pain, sudden onset of nausea vomiting diarrhea for the last 7 days. Patient went to the emergency department on  and CT abdomen pelvis at that time showed left inguinal hernia and 12 mm periesophageal lymph node. Symptoms were thought to be secondary to inguinal hernia and was referred to general surgery. However his nausea and vomiting got worse associated with diarrhea the day after so he came to the emergency department on . Patient denies taking any medications at home. Patient complained of intermittent blood in stools but has not been seen by GI. In the ED CBC and CMP were unremarkable with lipase of 535. C. difficile was negative. Urine studies not concerning for infection. Repeat CT abdomen pelvis showed acute inflammation of small bowel and sigmoid colon could be due to enterocolitis, also persistent enlarged paraesophageal lymph node with small hiatal hernia. Patient received fluids and Cipro Flagyl and was recommended to follow-up with GI outpatient for EGD. However family was concerned for malignancy and requested admission. GI was consulted and they believe likely secondary to infectious gastroenteritis. CT findings showing acute inflammation of small bowel and sigmoid colon which could likely be secondary to enterocolitis. Patient was continued on Cipro Flagyl and will continue course at discharge. GI saw patient today and that agreed with both patient and wife that patient will undergo EGD on Monday for further evaluation and treatment. Patient and patient wife would like to discharge today 5/13 and will have follow-up with PCP and GI on Monday. Patient's lymphadenopathy was evaluated on CT abdomen pelvis with persistent enlarged paraesophageal lymph node. Patient will undergo EGD this Monday. If not patient will need repeat CT chest in 3 months and follow-up. For patient's left inguinal hernia patient will need follow-up with general surgery outpatient as he was referred when he was at the hospital the other day. Patient was advised that he needs to follow-up with his primary care provider within the next 3 to 5 days discussed recent hospitalization any changes made to his medications. Patient also need to follow-up with GI this Monday for scheduled EGD. Patient will need to follow-up with general surgery for evaluation and treatment of left inguinal hernia outpatient. Disposition: Home or Self Care  Diet: ADULT DIET Regular; [facility] [place] (GERD/Peptic Ulcer)  Code Status: Full Code    Follow Up Orders: Follow-up Appointments   Procedures    FOLLOW UP VISIT Appointment in: 3 - 5 Days Follow-up with your primary care provider within the next 4 to 5 days discussed recent hospitalization any changes made to her medication. Please follow-up with your gastroenterologist on Monday to under. .. Follow-up with your primary care provider within the next 4 to 5 days discussed recent hospitalization any changes made to her medication.   Please follow-up with your gastroenterologist on Monday to undergo EGD. Standing Status:   Standing     Number of Occurrences:   1     Order Specific Question:   Appointment in     Answer:   3 - 5 Days       Follow-up Information     Follow up With Specialties Details Why Contact Info    Higinio Smith DO Internal Medicine Physician   [address] [facility] [place]  [place] [phone]  [phone]            Follow up labs/diagnostics (ultimately defer to outpatient provider): Follow-up PCP next 3 to 5 days  Follow-up with GI on Monday 5/16 for EGD  Follow-up with general surgery for evaluation and treatment of left inguinal hernia    Time spent in patient discharge and coordination 50 minutes. Plan was discussed with patient. All questions answered. Patient was stable at time of discharge. Instructions given to call a physician or return if any concerns. Discharge Info:   Current Discharge Medication List      CONTINUE these medications which have CHANGED    Details   metroNIDAZOLE (FLAGYL) 500 mg tablet Take 1 Tablet by mouth three (3) times daily for 5 days. Qty: 15 Tablet, Refills: 0  Start date: 5/13/2022, End date: 5/18/2022      pantoprazole (PROTONIX) 40 mg tablet Take 1 Tablet by mouth daily for 14 days. Qty: 14 Tablet, Refills: 0  Start date: 5/13/2022, End date: 5/27/2022      ciprofloxacin HCl (CIPRO) 250 mg tablet Take 1 Tablet by mouth two (2) times a day for 5 days. Qty: 10 Tablet, Refills: 0  Start date: 5/14/2022, End date: 5/19/2022         CONTINUE these medications which have NOT CHANGED    Details   promethazine (PHENERGAN) 25 mg tablet Take 1 Tablet by mouth every six (6) hours as needed for Nausea. Qty: 12 Tablet, Refills: 0      ondansetron hcl (Zofran) 4 mg tablet Take 1 Tablet by mouth every eight (8) hours as needed for Nausea or Vomiting.   Qty: 12 Tablet, Refills: 0         STOP taking these medications       ondansetron (ZOFRAN ODT) 8 mg disintegrating tablet Comments:   Reason for Stopping: hyoscyamine SL (LEVSIN/SL) 0.125 mg SL tablet Comments:   Reason for Stopping:         raNITIdine (ZANTAC) 150 mg tablet Comments:   Reason for Stopping:               Procedures done this admission:  * No surgery found *    Consults this admission:  IP CONSULT TO GASTROENTEROLOGY    Echocardiogram/EKG results:  No results found for this or any previous visit. EKG Results     Procedure 720 Value Units Date/Time    EKG, 12 LEAD, INITIAL [[id number]] Collected: 05/13/22 0634    Order Status: Completed Updated: 05/13/22 1401     Ventricular Rate 55 BPM      Atrial Rate 55 BPM      P-R Interval 138 ms      QRS Duration 92 ms      Q-T Interval 414 ms      QTC Calculation (Bezet) 396 ms      Calculated P Axis -2 degrees      Calculated R Axis 6 degrees      Calculated T Axis 29 degrees      Diagnosis --     Sinus bradycardia  Inferior infarct , age undetermined  Abnormal ECG  No previous ECGs available            Diagnostic Imaging/Tests:   No results found.     All Micro Results     None          Labs: Results:       BMP, Mg, Phos Recent Labs     05/12/22  1300      K 3.3*      CO2 28   AGAP 6*   BUN 10   CREA 1.00   CA 8.7   *   MG 2.3      CBC Recent Labs     05/12/22  1300   WBC 7.1   RBC 5.77*   HGB 16.1   HCT 48.8      GRANS 59   LYMPH 30   EOS 3   MONOS 6   BASOS 1   IG 1   ANEU 4.2   ABL 2.1   DAMARIS 0.2   ABM 0.5   ABB 0.1   AIG 0.0      LFT Recent Labs     05/12/22  1300   ALT 43   AP 80   TP 7.0   ALB 3.7   GLOB 3.3   AGRAT 1.1*      Cardiac Testing No results found for: BNPP, BNP, CPK, RCK1, RCK2, RCK3, RCK4, CKMB, CKNDX, CKND1, TROPT, TROIQ   Coagulation Tests No results found for: PTP, INR, APTT, INREXT   A1c No results found for: HBA1C, [id number]   Lipid Panel Lab Results   Component Value Date/Time    Cholesterol, total 157 01/03/2019 08:55 AM    HDL Cholesterol 42 01/03/2019 08:55 AM    LDL, calculated 102 (H) 01/03/2019 08:55 AM    VLDL, calculated 13 01/03/2019 08:55 AM Triglyceride 66 01/03/2019 08:55 AM      Thyroid Panel Lab Results   Component Value Date/Time    TSH 5.070 (H) 01/03/2019 08:55 AM    T4, Free 1.18 01/03/2019 08:55 AM        Most Recent UA Lab Results   Component Value Date/Time    WBC 0-3 06/06/2011 01:20 PM    RBC 0 06/06/2011 01:20 PM    Epithelial cells 0-3 06/06/2011 01:20 PM    Bacteria 1+ (H) 06/06/2011 01:20 PM    Casts 0 06/06/2011 01:20 PM    Crystals, urine 0 06/06/2011 01:20 PM    Mucus 2+ (H) 06/06/2011 01:20 PM          All Labs from Last 24 Hrs:  Recent Results (from the past 24 hour(s))   CULTURE, STOOL    Collection Time: 05/12/22  4:22 PM    Specimen: Stool   Result Value Ref Range    Special Requests: NO SPECIAL REQUESTS      Culture result:        NO GROWTH OF SALMONELLA OR SHIGELLA IS EVIDENT ON FIRST READING, FINAL REPORT TO FOLLOW AFTER FURTHER INCUBATION OF CULTURE   C. DIFFICILE/EPI PCR    Collection Time: 05/12/22  4:22 PM    Specimen: Stool   Result Value Ref Range    Special Requests: NO SPECIAL REQUESTS      Culture result:        Toxigenic C. difficile NEGATIVE                         C. difficile target DNA sequences are not detected.    POC URINE MACROSCOPIC    Collection Time: 05/12/22  4:26 PM   Result Value Ref Range    Spec. gravity (POC) 1.015 1.001 - 1.023      pH, urine  (POC) 7.0 5.0 - 9.0      Protein (POC) Negative NEG mg/dL    Glucose, urine (POC) Negative NEG mg/dL    Ketones (POC) Negative NEG mg/dL    Bilirubin (POC) Negative NEG      Blood (POC) Negative NEG      Urobilinogen (POC) 0.2 0.2 - 1.0 EU/dL    Nitrite (POC) Negative NEG      Leukocyte esterase (POC) Negative NEG      Performed by Gurmeet Crawford    EKG, 12 LEAD, INITIAL    Collection Time: 05/13/22  6:34 AM   Result Value Ref Range    Ventricular Rate 55 BPM    Atrial Rate 55 BPM    P-R Interval 138 ms    QRS Duration 92 ms    Q-T Interval 414 ms    QTC Calculation (Bezet) 396 ms    Calculated P Axis -2 degrees    Calculated R Axis 6 degrees    Calculated T Axis 29 degrees    Diagnosis       Sinus bradycardia  Inferior infarct , age undetermined  Abnormal ECG  No previous ECGs available         Current Med List in Hospital:   Current Facility-Administered Medications   Medication Dose Route Frequency    [Held by provider] acetaminophen (TYLENOL) tablet 650 mg  650 mg Oral Q6H PRN    Or    [Held by provider] acetaminophen (TYLENOL) suppository 650 mg  650 mg Rectal Q6H PRN    enoxaparin (LOVENOX) injection 40 mg  40 mg SubCUTAneous Q24H    ondansetron (ZOFRAN ODT) tablet 4 mg  4 mg Oral Q8H PRN    Or    ondansetron (ZOFRAN) injection 4 mg  4 mg IntraVENous Q6H PRN    polyethylene glycol (MIRALAX) packet 17 g  17 g Oral DAILY PRN    sodium chloride (NS) flush 5-40 mL  5-40 mL IntraVENous Q8H    sodium chloride (NS) flush 5-40 mL  5-40 mL IntraVENous PRN    ciprofloxacin (CIPRO) 400 mg in D5W IVPB (premix)  400 mg IntraVENous Q12H    Lactobacillus Acidoph & Bulgar (FLORANEX) tablet 2 Tablet  2 Tablet Oral BID    loperamide (IMODIUM) capsule 2 mg  2 mg Oral Q4H PRN    metroNIDAZOLE (FLAGYL) tablet 500 mg  500 mg Oral TID    pantoprazole (PROTONIX) tablet 40 mg  40 mg Oral ACB    sodium chloride (NS) flush 5-10 mL  5-10 mL IntraVENous Q8H    sodium chloride (NS) flush 5-10 mL  5-10 mL IntraVENous PRN    butalbital-acetaminophen-caffeine (FIORICET, ESGIC) -40 mg per tablet 1 Tablet  1 Tablet Oral Q6H PRN       No Known Allergies    There is no immunization history on file for this patient.     Recent Vital Data:  Patient Vitals for the past 24 hrs:   Temp Pulse Resp BP SpO2   05/13/22 1528 97.7 °F (36.5 °C) 66 18 127/87 100 %   05/13/22 1122 98.5 °F (36.9 °C) 79 18 132/83 99 %   05/13/22 0825     99 %   05/13/22 0800 98.3 °F (36.8 °C) 77 18 118/84 97 %   05/13/22 0602     98 %     Oxygen Therapy  O2 Sat (%): 100 % (05/13/22 1528)  Pulse via Oximetry: 94 beats per minute (05/13/22 0825)  O2 Device: None (Room air) (05/13/22 0825)    Estimated body mass index is 28.08 kg/m² as calculated from the following:    Height as of 5/12/22: 5' 6\" (1.676 m). Weight as of 5/12/22: 78.9 kg (174 lb). No intake or output data in the 24 hours ending 05/13/22 1613      Physical Exam:  General:    Well nourished. No overt distress  Head:  Normocephalic, atraumatic  Eyes:  Sclerae appear normal.  Pupils equally round. HENT:  Nares appear normal, no drainage. Moist mucous membranes  Neck:  No restricted ROM. Trachea midline  CV:   RRR. No m/r/g. No JVD  Lungs:   CTAB. No wheezing, rhonchi, or rales. Even, unlabored  Abdomen:   Soft, mild abdominal tenderness, nondistended. Extremities: Warm and dry. No cyanosis or clubbing. No edema. Skin:     No rashes. Normal coloration  Neuro:  CN II-XII grossly intact. Psych:  Normal mood and affect. Signed:  Judithe Meigs, MD    Part of this note may have been written by using a voice dictation software. The note has been proof read but may still contain some grammatical/other typographical errors.

## 2022-05-13 NOTE — PROGRESS NOTES
Problem: Falls - Risk of  Goal: *Absence of Falls  Description: Document Sammi Puente Fall Risk and appropriate interventions in the flowsheet.   Outcome: Progressing Towards Goal  Note: Fall Risk Interventions:            Medication Interventions: Patient to call before getting OOB,Teach patient to arise slowly                   Problem: Patient Education: Go to Patient Education Activity  Goal: Patient/Family Education  Outcome: Progressing Towards Goal     Problem: Nausea/Vomiting (Adult)  Goal: *Absence of nausea/vomiting  Outcome: Progressing Towards Goal  Goal: *Palliation of nausea/vomiting (Palliative Care)  Outcome: Progressing Towards Goal     Problem: Patient Education: Go to Patient Education Activity  Goal: Patient/Family Education  Outcome: Progressing Towards Goal

## 2022-05-14 LAB
BACTERIA SPEC CULT: NORMAL
SERVICE CMNT-IMP: NORMAL

## 2022-05-15 LAB
ATRIAL RATE: 55 BPM
CALCULATED P AXIS, ECG09: -2 DEGREES
CALCULATED R AXIS, ECG10: 6 DEGREES
CALCULATED T AXIS, ECG11: 29 DEGREES
DIAGNOSIS, 93000: NORMAL
P-R INTERVAL, ECG05: 138 MS
Q-T INTERVAL, ECG07: 414 MS
QRS DURATION, ECG06: 92 MS
QTC CALCULATION (BEZET), ECG08: 396 MS
VENTRICULAR RATE, ECG03: 55 BPM

## 2022-05-16 ENCOUNTER — HOSPITAL ENCOUNTER (OUTPATIENT)
Dept: LAB | Age: 37
Discharge: HOME | End: 2022-05-16

## 2022-05-16 PROCEDURE — 88305 TISSUE EXAM BY PATHOLOGIST: CPT

## 2022-05-16 PROCEDURE — 88312 SPECIAL STAINS GROUP 1: CPT

## 2022-05-26 ENCOUNTER — OFFICE VISIT (OUTPATIENT)
Dept: INTERNAL MEDICINE CLINIC | Facility: CLINIC | Age: 37
End: 2022-05-26
Payer: COMMERCIAL

## 2022-05-26 ENCOUNTER — PREP FOR PROCEDURE (OUTPATIENT)
Dept: SURGERY | Age: 37
End: 2022-05-26

## 2022-05-26 ENCOUNTER — OFFICE VISIT (OUTPATIENT)
Dept: SURGERY | Age: 37
End: 2022-05-26
Payer: COMMERCIAL

## 2022-05-26 VITALS
BODY MASS INDEX: 29.41 KG/M2 | DIASTOLIC BLOOD PRESSURE: 92 MMHG | SYSTOLIC BLOOD PRESSURE: 132 MMHG | OXYGEN SATURATION: 98 % | WEIGHT: 183 LBS | TEMPERATURE: 97.7 F | HEART RATE: 88 BPM | HEIGHT: 66 IN

## 2022-05-26 VITALS
WEIGHT: 180 LBS | BODY MASS INDEX: 28.93 KG/M2 | SYSTOLIC BLOOD PRESSURE: 151 MMHG | HEIGHT: 66 IN | DIASTOLIC BLOOD PRESSURE: 108 MMHG | HEART RATE: 73 BPM

## 2022-05-26 DIAGNOSIS — R03.0 ELEVATED BP WITHOUT DIAGNOSIS OF HYPERTENSION: ICD-10-CM

## 2022-05-26 DIAGNOSIS — A09 INFECTIOUS GASTROENTERITIS AND COLITIS: ICD-10-CM

## 2022-05-26 DIAGNOSIS — K40.91 UNILATERAL INGUINAL HERNIA, WITHOUT OBSTRUCTION OR GANGRENE, RECURRENT: ICD-10-CM

## 2022-05-26 DIAGNOSIS — K40.90 LEFT INGUINAL HERNIA: Primary | ICD-10-CM

## 2022-05-26 DIAGNOSIS — R59.0 MEDIASTINAL ADENOPATHY: Primary | ICD-10-CM

## 2022-05-26 DIAGNOSIS — R10.84 GENERALIZED ABDOMINAL PAIN: ICD-10-CM

## 2022-05-26 LAB
ALBUMIN SERPL-MCNC: 3.8 G/DL (ref 3.5–5)
ALBUMIN/GLOB SERPL: 1.3 {RATIO} (ref 1.2–3.5)
ALP SERPL-CCNC: 72 U/L (ref 50–136)
ALT SERPL-CCNC: 41 U/L (ref 12–65)
ANION GAP SERPL CALC-SCNC: 6 MMOL/L (ref 7–16)
AST SERPL-CCNC: 17 U/L (ref 15–37)
BASOPHILS # BLD: 0.1 K/UL (ref 0–0.2)
BASOPHILS NFR BLD: 2 % (ref 0–2)
BILIRUB SERPL-MCNC: 0.2 MG/DL (ref 0.2–1.1)
BUN SERPL-MCNC: 11 MG/DL (ref 6–23)
CALCIUM SERPL-MCNC: 8.7 MG/DL (ref 8.3–10.4)
CHLORIDE SERPL-SCNC: 108 MMOL/L (ref 98–107)
CO2 SERPL-SCNC: 28 MMOL/L (ref 21–32)
CREAT SERPL-MCNC: 1.2 MG/DL (ref 0.8–1.5)
DIFFERENTIAL METHOD BLD: NORMAL
EOSINOPHIL # BLD: 0.2 K/UL (ref 0–0.8)
EOSINOPHIL NFR BLD: 3 % (ref 0.5–7.8)
ERYTHROCYTE [DISTWIDTH] IN BLOOD BY AUTOMATED COUNT: 12.3 % (ref 11.9–14.6)
GLOBULIN SER CALC-MCNC: 3 G/DL (ref 2.3–3.5)
GLUCOSE SERPL-MCNC: 103 MG/DL (ref 65–100)
HCT VFR BLD AUTO: 45.2 % (ref 41.1–50.3)
HGB BLD-MCNC: 14.4 G/DL (ref 13.6–17.2)
IMM GRANULOCYTES # BLD AUTO: 0 K/UL (ref 0–0.5)
IMM GRANULOCYTES NFR BLD AUTO: 0 % (ref 0–5)
LYMPHOCYTES # BLD: 1.9 K/UL (ref 0.5–4.6)
LYMPHOCYTES NFR BLD: 31 % (ref 13–44)
MCH RBC QN AUTO: 27.7 PG (ref 26.1–32.9)
MCHC RBC AUTO-ENTMCNC: 31.9 G/DL (ref 31.4–35)
MCV RBC AUTO: 87.1 FL (ref 79.6–97.8)
MONOCYTES # BLD: 0.6 K/UL (ref 0.1–1.3)
MONOCYTES NFR BLD: 9 % (ref 4–12)
NEUTS SEG # BLD: 3.5 K/UL (ref 1.7–8.2)
NEUTS SEG NFR BLD: 55 % (ref 43–78)
NRBC # BLD: 0 K/UL (ref 0–0.2)
PLATELET # BLD AUTO: 312 K/UL (ref 150–450)
PMV BLD AUTO: 10.2 FL (ref 9.4–12.3)
POTASSIUM SERPL-SCNC: 3.9 MMOL/L (ref 3.5–5.1)
PROT SERPL-MCNC: 6.8 G/DL (ref 6.3–8.2)
RBC # BLD AUTO: 5.19 M/UL (ref 4.23–5.6)
SODIUM SERPL-SCNC: 142 MMOL/L (ref 138–145)
T4 FREE SERPL-MCNC: 0.9 NG/DL (ref 0.78–1.46)
TSH, 3RD GENERATION: 2.92 UIU/ML (ref 0.36–3.74)
VIT B12 SERPL-MCNC: 570 PG/ML (ref 193–986)
WBC # BLD AUTO: 6.3 K/UL (ref 4.3–11.1)

## 2022-05-26 PROCEDURE — 99214 OFFICE O/P EST MOD 30 MIN: CPT | Performed by: INTERNAL MEDICINE

## 2022-05-26 PROCEDURE — 99204 OFFICE O/P NEW MOD 45 MIN: CPT | Performed by: SURGERY

## 2022-05-26 RX ORDER — PANTOPRAZOLE SODIUM 40 MG/1
40 TABLET, DELAYED RELEASE ORAL DAILY
COMMUNITY

## 2022-05-26 RX ORDER — AMLODIPINE BESYLATE 5 MG/1
5 TABLET ORAL DAILY
Qty: 90 TABLET | Refills: 1 | Status: SHIPPED | OUTPATIENT
Start: 2022-05-26

## 2022-05-26 ASSESSMENT — PATIENT HEALTH QUESTIONNAIRE - PHQ9
SUM OF ALL RESPONSES TO PHQ9 QUESTIONS 1 & 2: 0
2. FEELING DOWN, DEPRESSED OR HOPELESS: 0
1. LITTLE INTEREST OR PLEASURE IN DOING THINGS: 0
SUM OF ALL RESPONSES TO PHQ QUESTIONS 1-9: 0

## 2022-05-26 ASSESSMENT — ENCOUNTER SYMPTOMS
NAUSEA: 1
ABDOMINAL PAIN: 1
VOMITING: 1
RESPIRATORY NEGATIVE: 1
ALLERGIC/IMMUNOLOGIC NEGATIVE: 1
EYES NEGATIVE: 1

## 2022-05-26 NOTE — H&P (VIEW-ONLY)
Jayesh Perez MD, [facility], [address] [place] [address]  Ladarius Vazquez  Phone [phone]   Fax [phone]      Date of visit: 5/27/2022     Primary/Requesting provider: Clare Delatorre DO    Chief Complaint   Patient presents with    New Patient     ed follow up, inguinal hernia         Patient is a 39 y.o. male who presents for evaluation of a hernia. This was noted during recent hospitalization for what was eventually deemed an enterocolitis. He reports resolution of the presenting symptoms of abd pain and diarrhea. He has seen GI in followup, with plans for colonoscopy (ostensibly due to occasional BRBPR?) pending repair of his hernia; he has already had EGD to evaluate esophagus due to persistently enlarged mediastinal node; they do not know pathology but no mention of a mass was given immediately post procedure. He reports awareness of a left groin bulge for about 2 years. This is increasing in size and discomfort. When protruding, he experiences pain and sometimes nausea. The bulge is reducible, only occasionally requiring massage, but does immediately herniate on standing. He denies any right groin concerns. Medications:   Current Outpatient Medications on File Prior to Visit   Medication Sig Dispense Refill    pantoprazole (PROTONIX) 40 MG tablet Take 40 mg by mouth daily       No current facility-administered medications on file prior to visit. Allergies: No Known Allergies     Past History:  Past Medical History:   Diagnosis Date    GERD (gastroesophageal reflux disease)       No past surgical history on file.      Family and Social History:  Family History   Problem Relation Age of Onset    Cancer Maternal Grandfather     No Known Problems Father     Hypertension Mother     Cancer Mother      Social History     Socioeconomic History    Marital status:      Spouse name: Not on file    Number of children: Not on file    Years of education: Not on file    Highest education level: Not on file   Occupational History    Not on file   Tobacco Use    Smoking status: Never Smoker    Smokeless tobacco: Never Used   Substance and Sexual Activity    Alcohol use: No    Drug use: No     Types: Methamphetamines (Crystal Meth)    Sexual activity: Not on file   Other Topics Concern    Not on file   Social History Narrative    Not on file     Social Determinants of Health     Financial Resource Strain:     Difficulty of Paying Living Expenses: Not on file   Food Insecurity:     Worried About Running Out of Food in the Last Year: Not on file    Dane of Food in the Last Year: Not on file   Transportation Needs:     Lack of Transportation (Medical): Not on file    Lack of Transportation (Non-Medical): Not on file   Physical Activity:     Days of Exercise per Week: Not on file    Minutes of Exercise per Session: Not on file   Stress:     Feeling of Stress : Not on file   Social Connections:     Frequency of Communication with Friends and Family: Not on file    Frequency of Social Gatherings with Friends and Family: Not on file    Attends Gnosticism Services: Not on file    Active Member of [address] [place] [address] or Organizations: Not on file    Attends Club or Organization Meetings: Not on file    Marital Status: Not on file   Intimate Partner Violence:     Fear of Current or Ex-Partner: Not on file    Emotionally Abused: Not on file    Physically Abused: Not on file    Sexually Abused: Not on file   Housing Stability:     Unable to Pay for Housing in the Last Year: Not on file    Number of [place] in the Last Year: Not on file    Unstable Housing in the Last Year: Not on file           Review of Systems   Constitutional: Negative. HENT: Negative. Eyes: Negative. Respiratory: Negative. Cardiovascular: Negative. Gastrointestinal: Positive for abdominal pain, nausea and vomiting. Endocrine: Negative. Genitourinary: Negative. Musculoskeletal: Negative. Skin: Negative. Allergic/Immunologic: Negative. Neurological: Negative. Hematological: Negative. Psychiatric/Behavioral: Negative. Physical Exam  Vitals and nursing note reviewed. HENT:      Head: Normocephalic and atraumatic. Eyes:      General: No scleral icterus. Pupils: Pupils are equal, round, and reactive to light. Cardiovascular:      Rate and Rhythm: Normal rate. Pulmonary:      Effort: Pulmonary effort is normal. No respiratory distress. Breath sounds: No stridor. Abdominal:      General: There is no distension. Palpations: Abdomen is soft. There is no mass. Tenderness: There is no abdominal tenderness. Hernia: A hernia is present. Hernia is present in the left inguinal area (mildly tender, reducible). There is no hernia in the right inguinal area. Genitourinary:     Penis: Normal.       Testes: Normal.   Neurological:      General: No focal deficit present. Mental Status: He is alert and oriented to person, place, and time. Cranial Nerves: No cranial nerve deficit. Psychiatric:         Mood and Affect: Mood normal.         Behavior: Behavior normal.         Thought Content: Thought content normal.         Judgment: Judgment normal.     DATA:  Reports of CT x 2 earlier this month reviewed- [facility] confirmed. .       1. Left inguinal hernia         Reassured pt/wife that, in the absence of mention of a mass, there is likely no cause for concern regarding esophageal cancer (above path was not noted prior to visit). Uncertain what the real indication is for colonoscopy- suspect to r/o Crohn's. Observation for symptom recurrence ana cristina also seem to be a valid option. Discussed risk of incarceration/strangulation event in inguinal hernias, at least those identified incidentally which may not apply here, as 1% annual risk at 5 years in recent study randomizing men to surgery or observation.   The same study indicated

## 2022-05-26 NOTE — PROGRESS NOTES
Eufemia Pena was seen today for follow-up from hospital and hypertension. Diagnoses and all orders for this visit:    Mediastinal adenopathy  -     CBC with Auto Differential; Future  -     CBC with Auto Differential    Infectious gastroenteritis and colitis  -     Vitamin B12; Future  -     Celiac Disease Panel; Future  -     Celiac Disease Panel  -     Vitamin B12    Unilateral inguinal hernia, without obstruction or gangrene, recurrent  Comments:  eval to get fixed    Elevated BP without diagnosis of hypertension  Comments:  check some labs  Orders:  -     CBC with Auto Differential; Future  -     TSH; Future  -     T4, Free; Future  -     Comprehensive Metabolic Panel; Future  -     Comprehensive Metabolic Panel  -     T4, Free  -     TSH  -     CBC with Auto Differential    Generalized abdominal pain  Comments:  resolved tx abx   Orders:  -     Vitamin B12; Future  -     Vitamin B12    Other orders  -     amLODIPine (NORVASC) 5 MG tablet; Take 1 tablet by mouth daily          Jorie Castleman is a 39 y.o. male    Chief Complaint   Patient presents with   [address] from [address] Hypertension     abd pain-colitis-says hernia fix necessary before gets colo. Weight ok  nauesa vomit resolved after abx. Eating drinking wnl now    Feels good today,on ppi.    bp has been elevated some . denies otc meds   No etoh      No results found for this visit on 05/26/22.     Past Medical History:   Diagnosis Date    GERD (gastroesophageal reflux disease)        Family History   Problem Relation Age of Onset    Cancer Maternal Grandfather     No Known Problems Father     Hypertension Mother     Cancer Mother         Social History     Socioeconomic History    Marital status:      Spouse name: Not on file    Number of children: Not on file    Years of education: Not on file    Highest education level: Not on file   Occupational History    Not on file   Tobacco Use    Smoking status: Never Smoker    Smokeless tobacco: Never Used   Substance and Sexual Activity    Alcohol use: No    Drug use: No     Types: Methamphetamines (Crystal Meth)    Sexual activity: Not on file   Other Topics Concern    Not on file   Social History Narrative    Not on file     Social Determinants of Health     Financial Resource Strain:     Difficulty of Paying Living Expenses: Not on file   Food Insecurity:     Worried About Running Out of Food in the Last Year: Not on file    Dane of Food in the Last Year: Not on file   Transportation Needs:     Lack of Transportation (Medical): Not on file    Lack of Transportation (Non-Medical):  Not on file   Physical Activity:     Days of Exercise per Week: Not on file    Minutes of Exercise per Session: Not on file   Stress:     Feeling of Stress : Not on file   Social Connections:     Frequency of Communication with Friends and Family: Not on file    Frequency of Social Gatherings with Friends and Family: Not on file    Attends Sabianist Services: Not on file    Active Member of [address] [place] [address] or Organizations: Not on file    Attends Club or Organization Meetings: Not on file    Marital Status: Not on file   Intimate Partner Violence:     Fear of Current or Ex-Partner: Not on file    Emotionally Abused: Not on file    Physically Abused: Not on file    Sexually Abused: Not on file   Housing Stability:     Unable to Pay for Housing in the Last Year: Not on file    Number of [place] in the Last Year: Not on file    Unstable Housing in the Last Year: Not on file         Current Outpatient Medications:     pantoprazole (PROTONIX) 40 MG tablet, Take 40 mg by mouth daily, Disp: , Rfl:     amLODIPine (NORVASC) 5 MG tablet, Take 1 tablet by mouth daily, Disp: 90 tablet, Rfl: 1    No Known Allergies      Review of Systems      Vitals:    05/26/22 1404 05/26/22 1409   BP: 132/88 (!) 132/92   Pulse: 88    Temp: 97.7 °F (36.5 °C)    TempSrc: Temporal    SpO2: 98%    Weight: 183 lb (83 kg) Height: 5' 6\" (1.676 m)            Physical Exam       Lana Pina was seen today for follow-up from hospital and hypertension. Diagnoses and all orders for this visit:    Mediastinal adenopathy  -     CBC with Auto Differential; Future  -     CBC with Auto Differential    Infectious gastroenteritis and colitis  -     Vitamin B12; Future  -     Celiac Disease Panel; Future  -     Celiac Disease Panel  -     Vitamin B12    Unilateral inguinal hernia, without obstruction or gangrene, recurrent  Comments:  eval to get fixed    Elevated BP without diagnosis of hypertension  Comments:  check some labs  Orders:  -     CBC with Auto Differential; Future  -     TSH; Future  -     T4, Free; Future  -     Comprehensive Metabolic Panel; Future  -     Comprehensive Metabolic Panel  -     T4, Free  -     TSH  -     CBC with Auto Differential    Generalized abdominal pain  Comments:  resolved tx abx   Orders:  -     Vitamin B12; Future  -     Vitamin B12    Other orders  -     amLODIPine (NORVASC) 5 MG tablet;  Take 1 tablet by mouth daily                 Valri Annia, DO

## 2022-05-30 LAB
GLIADIN PEPTIDE IGA SER-ACNC: 3 UNITS (ref 0–19)
GLIADIN PEPTIDE IGG SER-ACNC: 1 UNITS (ref 0–19)
IGA SERPL-MCNC: 259 MG/DL (ref 90–386)
TTG IGA SER-ACNC: <2 U/ML (ref 0–3)
TTG IGG SER-ACNC: <2 U/ML (ref 0–5)

## 2022-06-09 NOTE — OR NURSING
Patient verified name and . Order for consent was found in EHR and matches case posting; patient verifies   procedure. left open inguinal hernia repair with mesh  Type 1b surgery, PAT phone assessment complete. Orders were received. Labs per surgeon: None found in EHR  Labs per anesthesia protocol: None    Patient answered medical/surgical history questions at their best of ability. All prior to admission medications documented in [facility]. Patient instructed to take the following medications the day of surgery according to anesthesia guidelines with a small sip of water: Amlodipine and Pantoprazole On the day before surgery please take Acetaminophen 1000mg in the morning and then again before bed. You may substitute for Tylenol 650 mg. Hold all vitamins 7 days prior to surgery and NSAIDS 5 days prior to surgery. Prescription meds to hold:None  Patient instructed on the following:    > Arrive at A Entrance, time of arrival to be called the day before by 1700  > NPO after midnight including gum, mints, and ice chips  > Responsible adult must drive patient to the hospital, stay during surgery, and patient will need supervision 24 hours after anesthesia  > Use antibacterial soap in shower the night before surgery and on the morning of surgery  > All piercings must be removed prior to arrival.    > Leave all valuables (money and jewelry) at home but bring insurance card and ID on DOS.   > Do not wear make-up, nail polish, lotions, cologne, perfumes, powders, or oil on skin. Artificial nails are not permitted.

## 2022-06-10 ENCOUNTER — HOSPITAL ENCOUNTER (OUTPATIENT)
Age: 37
Setting detail: OUTPATIENT SURGERY
Discharge: HOME | End: 2022-06-10
Attending: SURGERY | Admitting: SURGERY
Payer: COMMERCIAL

## 2022-06-10 ENCOUNTER — ANESTHESIA EVENT (OUTPATIENT)
Dept: SURGERY | Age: 37
End: 2022-06-10
Payer: COMMERCIAL

## 2022-06-10 ENCOUNTER — ANESTHESIA (OUTPATIENT)
Dept: SURGERY | Age: 37
End: 2022-06-10
Payer: COMMERCIAL

## 2022-06-10 VITALS
TEMPERATURE: 97 F | RESPIRATION RATE: 12 BRPM | SYSTOLIC BLOOD PRESSURE: 140 MMHG | BODY MASS INDEX: 28.73 KG/M2 | HEIGHT: 66 IN | DIASTOLIC BLOOD PRESSURE: 93 MMHG | HEART RATE: 70 BPM | OXYGEN SATURATION: 100 % | WEIGHT: 178.8 LBS

## 2022-06-10 DIAGNOSIS — K40.91 UNILATERAL INGUINAL HERNIA, WITHOUT OBSTRUCTION OR GANGRENE, RECURRENT: Primary | ICD-10-CM

## 2022-06-10 PROCEDURE — 6360000002 HC RX W HCPCS: Performed by: NURSE ANESTHETIST, CERTIFIED REGISTERED

## 2022-06-10 PROCEDURE — 2500000003 HC RX 250 WO HCPCS: Performed by: NURSE ANESTHETIST, CERTIFIED REGISTERED

## 2022-06-10 PROCEDURE — 7100000000 HC PACU RECOVERY - FIRST 15 MIN: Performed by: SURGERY

## 2022-06-10 PROCEDURE — 6370000000 HC RX 637 (ALT 250 FOR IP): Performed by: ANESTHESIOLOGY

## 2022-06-10 PROCEDURE — 3600000013 HC SURGERY LEVEL 3 ADDTL 15MIN: Performed by: SURGERY

## 2022-06-10 PROCEDURE — 2500000003 HC RX 250 WO HCPCS: Performed by: SURGERY

## 2022-06-10 PROCEDURE — 3700000001 HC ADD 15 MINUTES (ANESTHESIA): Performed by: SURGERY

## 2022-06-10 PROCEDURE — 2580000003 HC RX 258: Performed by: ANESTHESIOLOGY

## 2022-06-10 PROCEDURE — 2500000003 HC RX 250 WO HCPCS: Performed by: ANESTHESIOLOGY

## 2022-06-10 PROCEDURE — 3700000000 HC ANESTHESIA ATTENDED CARE: Performed by: SURGERY

## 2022-06-10 PROCEDURE — 7100000001 HC PACU RECOVERY - ADDTL 15 MIN: Performed by: SURGERY

## 2022-06-10 PROCEDURE — 3600000003 HC SURGERY LEVEL 3 BASE: Performed by: SURGERY

## 2022-06-10 PROCEDURE — 6360000002 HC RX W HCPCS: Performed by: ANESTHESIOLOGY

## 2022-06-10 PROCEDURE — C1781 MESH (IMPLANTABLE): HCPCS | Performed by: SURGERY

## 2022-06-10 PROCEDURE — 2709999900 HC NON-CHARGEABLE SUPPLY: Performed by: SURGERY

## 2022-06-10 PROCEDURE — 6360000002 HC RX W HCPCS: Performed by: SURGERY

## 2022-06-10 PROCEDURE — 49505 PRP I/HERN INIT REDUC >5 YR: CPT | Performed by: SURGERY

## 2022-06-10 DEVICE — MESH HERN L W1.6XL1.9IN INGUINAL WHT POLYPR MFIL PLUG PTCH: Type: IMPLANTABLE DEVICE | Site: GROIN | Status: FUNCTIONAL

## 2022-06-10 RX ORDER — NEOSTIGMINE METHYLSULFATE 1 MG/ML
INJECTION, SOLUTION INTRAVENOUS PRN
Status: DISCONTINUED | OUTPATIENT
Start: 2022-06-10 | End: 2022-06-10 | Stop reason: SDUPTHER

## 2022-06-10 RX ORDER — SODIUM CHLORIDE, SODIUM LACTATE, POTASSIUM CHLORIDE, CALCIUM CHLORIDE 600; 310; 30; 20 MG/100ML; MG/100ML; MG/100ML; MG/100ML
INJECTION, SOLUTION INTRAVENOUS CONTINUOUS
Status: DISCONTINUED | OUTPATIENT
Start: 2022-06-10 | End: 2022-06-10 | Stop reason: HOSPADM

## 2022-06-10 RX ORDER — HYDROMORPHONE HYDROCHLORIDE 1 MG/ML
0.5 INJECTION, SOLUTION INTRAMUSCULAR; INTRAVENOUS; SUBCUTANEOUS EVERY 5 MIN PRN
Status: DISCONTINUED | OUTPATIENT
Start: 2022-06-10 | End: 2022-06-10 | Stop reason: HOSPADM

## 2022-06-10 RX ORDER — PROPOFOL 10 MG/ML
INJECTION, EMULSION INTRAVENOUS PRN
Status: DISCONTINUED | OUTPATIENT
Start: 2022-06-10 | End: 2022-06-10 | Stop reason: SDUPTHER

## 2022-06-10 RX ORDER — SODIUM CHLORIDE 0.9 % (FLUSH) 0.9 %
5-40 SYRINGE (ML) INJECTION PRN
Status: DISCONTINUED | OUTPATIENT
Start: 2022-06-10 | End: 2022-06-10 | Stop reason: HOSPADM

## 2022-06-10 RX ORDER — OXYCODONE HYDROCHLORIDE 5 MG/1
5 TABLET ORAL PRN
Status: COMPLETED | OUTPATIENT
Start: 2022-06-10 | End: 2022-06-10

## 2022-06-10 RX ORDER — GLUCAGON 1 MG/ML
1 KIT INJECTION PRN
Status: DISCONTINUED | OUTPATIENT
Start: 2022-06-10 | End: 2022-06-10 | Stop reason: HOSPADM

## 2022-06-10 RX ORDER — HYDROMORPHONE HYDROCHLORIDE 1 MG/ML
0.5 INJECTION, SOLUTION INTRAMUSCULAR; INTRAVENOUS; SUBCUTANEOUS
Status: DISCONTINUED | OUTPATIENT
Start: 2022-06-10 | End: 2022-06-10 | Stop reason: HOSPADM

## 2022-06-10 RX ORDER — FENTANYL CITRATE 50 UG/ML
INJECTION, SOLUTION INTRAMUSCULAR; INTRAVENOUS PRN
Status: DISCONTINUED | OUTPATIENT
Start: 2022-06-10 | End: 2022-06-10 | Stop reason: SDUPTHER

## 2022-06-10 RX ORDER — BUPIVACAINE HYDROCHLORIDE AND EPINEPHRINE 5; 5 MG/ML; UG/ML
INJECTION, SOLUTION EPIDURAL; INTRACAUDAL; PERINEURAL PRN
Status: DISCONTINUED | OUTPATIENT
Start: 2022-06-10 | End: 2022-06-10 | Stop reason: ALTCHOICE

## 2022-06-10 RX ORDER — HYDROCODONE BITARTRATE AND ACETAMINOPHEN 5; 325 MG/1; MG/1
1 TABLET ORAL EVERY 4 HOURS PRN
Qty: 20 TABLET | Refills: 0 | Status: SHIPPED | OUTPATIENT
Start: 2022-06-10 | End: 2022-06-15

## 2022-06-10 RX ORDER — DEXTROSE MONOHYDRATE 50 MG/ML
100 INJECTION, SOLUTION INTRAVENOUS PRN
Status: DISCONTINUED | OUTPATIENT
Start: 2022-06-10 | End: 2022-06-10 | Stop reason: HOSPADM

## 2022-06-10 RX ORDER — GLYCOPYRROLATE 0.2 MG/ML
INJECTION INTRAMUSCULAR; INTRAVENOUS PRN
Status: DISCONTINUED | OUTPATIENT
Start: 2022-06-10 | End: 2022-06-10 | Stop reason: SDUPTHER

## 2022-06-10 RX ORDER — ONDANSETRON 2 MG/ML
4 INJECTION INTRAMUSCULAR; INTRAVENOUS
Status: DISCONTINUED | OUTPATIENT
Start: 2022-06-10 | End: 2022-06-10 | Stop reason: HOSPADM

## 2022-06-10 RX ORDER — ACETAMINOPHEN 500 MG
1000 TABLET ORAL ONCE
Status: COMPLETED | OUTPATIENT
Start: 2022-06-10 | End: 2022-06-10

## 2022-06-10 RX ORDER — LIDOCAINE HYDROCHLORIDE 20 MG/ML
INJECTION, SOLUTION EPIDURAL; INFILTRATION; INTRACAUDAL; PERINEURAL PRN
Status: DISCONTINUED | OUTPATIENT
Start: 2022-06-10 | End: 2022-06-10 | Stop reason: SDUPTHER

## 2022-06-10 RX ORDER — ROCURONIUM BROMIDE 10 MG/ML
INJECTION, SOLUTION INTRAVENOUS PRN
Status: DISCONTINUED | OUTPATIENT
Start: 2022-06-10 | End: 2022-06-10 | Stop reason: SDUPTHER

## 2022-06-10 RX ORDER — MIDAZOLAM HYDROCHLORIDE 2 MG/2ML
2 INJECTION, SOLUTION INTRAMUSCULAR; INTRAVENOUS
Status: COMPLETED | OUTPATIENT
Start: 2022-06-10 | End: 2022-06-10

## 2022-06-10 RX ORDER — LABETALOL HYDROCHLORIDE 5 MG/ML
10 INJECTION, SOLUTION INTRAVENOUS
Status: DISCONTINUED | OUTPATIENT
Start: 2022-06-10 | End: 2022-06-10 | Stop reason: HOSPADM

## 2022-06-10 RX ORDER — DEXAMETHASONE SODIUM PHOSPHATE 10 MG/ML
INJECTION INTRAMUSCULAR; INTRAVENOUS PRN
Status: DISCONTINUED | OUTPATIENT
Start: 2022-06-10 | End: 2022-06-10 | Stop reason: SDUPTHER

## 2022-06-10 RX ORDER — HYDROMORPHONE HYDROCHLORIDE 1 MG/ML
0.25 INJECTION, SOLUTION INTRAMUSCULAR; INTRAVENOUS; SUBCUTANEOUS EVERY 5 MIN PRN
Status: DISCONTINUED | OUTPATIENT
Start: 2022-06-10 | End: 2022-06-10 | Stop reason: HOSPADM

## 2022-06-10 RX ORDER — LIDOCAINE HYDROCHLORIDE 10 MG/ML
1 INJECTION, SOLUTION INFILTRATION; PERINEURAL
Status: COMPLETED | OUTPATIENT
Start: 2022-06-10 | End: 2022-06-10

## 2022-06-10 RX ORDER — SODIUM CHLORIDE 9 MG/ML
INJECTION, SOLUTION INTRAVENOUS PRN
Status: DISCONTINUED | OUTPATIENT
Start: 2022-06-10 | End: 2022-06-10 | Stop reason: HOSPADM

## 2022-06-10 RX ORDER — OXYCODONE HYDROCHLORIDE 5 MG/1
10 TABLET ORAL PRN
Status: COMPLETED | OUTPATIENT
Start: 2022-06-10 | End: 2022-06-10

## 2022-06-10 RX ORDER — SODIUM CHLORIDE 0.9 % (FLUSH) 0.9 %
5-40 SYRINGE (ML) INJECTION EVERY 12 HOURS SCHEDULED
Status: DISCONTINUED | OUTPATIENT
Start: 2022-06-10 | End: 2022-06-10 | Stop reason: HOSPADM

## 2022-06-10 RX ORDER — HYDROMORPHONE HYDROCHLORIDE 1 MG/ML
0.25 INJECTION, SOLUTION INTRAMUSCULAR; INTRAVENOUS; SUBCUTANEOUS
Status: DISCONTINUED | OUTPATIENT
Start: 2022-06-10 | End: 2022-06-10 | Stop reason: HOSPADM

## 2022-06-10 RX ORDER — ONDANSETRON 2 MG/ML
INJECTION INTRAMUSCULAR; INTRAVENOUS PRN
Status: DISCONTINUED | OUTPATIENT
Start: 2022-06-10 | End: 2022-06-10 | Stop reason: SDUPTHER

## 2022-06-10 RX ADMIN — GLYCOPYRROLATE 0.8 MG: 0.2 INJECTION, SOLUTION INTRAMUSCULAR; INTRAVENOUS at 18:44

## 2022-06-10 RX ADMIN — SODIUM CHLORIDE, SODIUM LACTATE, POTASSIUM CHLORIDE, AND CALCIUM CHLORIDE: 600; 310; 30; 20 INJECTION, SOLUTION INTRAVENOUS at 18:05

## 2022-06-10 RX ADMIN — ACETAMINOPHEN 1000 MG: 500 TABLET, FILM COATED ORAL at 14:06

## 2022-06-10 RX ADMIN — ROCURONIUM BROMIDE 50 MG: 50 INJECTION, SOLUTION INTRAVENOUS at 17:55

## 2022-06-10 RX ADMIN — Medication 2 G: at 17:35

## 2022-06-10 RX ADMIN — PROPOFOL 200 MG: 10 INJECTION, EMULSION INTRAVENOUS at 17:55

## 2022-06-10 RX ADMIN — LIDOCAINE HYDROCHLORIDE 1 ML: 10 INJECTION, SOLUTION INFILTRATION; PERINEURAL at 14:10

## 2022-06-10 RX ADMIN — ONDANSETRON 4 MG: 2 INJECTION INTRAMUSCULAR; INTRAVENOUS at 18:24

## 2022-06-10 RX ADMIN — MIDAZOLAM HYDROCHLORIDE 2 MG: 1 INJECTION, SOLUTION INTRAMUSCULAR; INTRAVENOUS at 16:10

## 2022-06-10 RX ADMIN — FENTANYL CITRATE 50 MCG: 50 INJECTION INTRAMUSCULAR; INTRAVENOUS at 17:55

## 2022-06-10 RX ADMIN — OXYCODONE 10 MG: 5 TABLET ORAL at 19:53

## 2022-06-10 RX ADMIN — SODIUM CHLORIDE, SODIUM LACTATE, POTASSIUM CHLORIDE, AND CALCIUM CHLORIDE: 600; 310; 30; 20 INJECTION, SOLUTION INTRAVENOUS at 14:10

## 2022-06-10 RX ADMIN — DEXAMETHASONE SODIUM PHOSPHATE 5 MG: 10 INJECTION INTRAMUSCULAR; INTRAVENOUS at 18:18

## 2022-06-10 RX ADMIN — LIDOCAINE HYDROCHLORIDE 100 MG: 20 INJECTION, SOLUTION EPIDURAL; INFILTRATION; INTRACAUDAL; PERINEURAL at 17:55

## 2022-06-10 RX ADMIN — FENTANYL CITRATE 50 MCG: 50 INJECTION INTRAMUSCULAR; INTRAVENOUS at 17:45

## 2022-06-10 RX ADMIN — Medication 5 MG: at 18:44

## 2022-06-10 ASSESSMENT — PAIN DESCRIPTION - DESCRIPTORS: DESCRIPTORS: ACHING

## 2022-06-10 ASSESSMENT — PAIN - FUNCTIONAL ASSESSMENT: PAIN_FUNCTIONAL_ASSESSMENT: 0-10

## 2022-06-10 NOTE — OP NOTE
Operative Report    Patient: Ana Diane MRN: [id number]     YOB: 1985  Age: 40 y.o. Sex: male          Preoperative Diagnosis: LEFT INGUINAL HERNIA     Postoperative Diagnosis: same    Procedure:  left  Direct Inguinal Hernia Repair with PerFix Plug     Anesthesia: General     Complications: none    Indications:  As outlined in History and Physical.    Procedure Details   Informed consent was obtained and the patient was brought to the operating room and placed on the table in a supine position. After successful induction of anesthesia, the groin was prepped and draped in the standard fashion. An incision was made in the suprainguinal region and was carried down through the subcutaneous tissues with cautery to the external oblique aponeurosis which was incised parallel to its fibers including opening through the external ring. one nerve(s) was/were identified on the floor of the canal and were avoided throughout the procedure. The cord structures were mobilized at the level of the pubic tubercle and isolated with a penrose drain. The cord was then dissected in its proximal third and an indirect sac was not identified. A moderate direct defect was noted. The direct sac was dissected from the surrounding structures with cautery and it was incised circumferentially at its base the enter the preperitoneal space which was further developed with insertion of a sponge. A large  PerFix plug was placed into the preperitoneal space and the inner petals of the plug were sutured to the surrounding conjoined tendon tissue with 2-0 Prolene.     Once this was accomplished, the patch was placed on the floor of the inguinal canal and sutured in place with 2-0 Prolene to the pubic tubercle medially, the inguinal ligament inferiorly, the rectus fascia superiorly, and laterally the upper leaf was crossed over the lower leaf and sutured to the inguinal ligament to provide a valve-like reconstruction of the internal ring. The cord was then returned to its normal anatomic position. The wound was irrigated, made hemostatic as necessary with cautery, and infiltrated with local.  The external oblique was then reapproximated with 2-0 vicryl to recreate the external ring, Aaliyah's fascia was closed with 3-0 Vicryl  and the skin with subcuticular 4-0 Vicryl. Steri-Strips, telfa, and a tegaderm dressing was applied. The patient tolerated the procedure well and was awakened from anesthesia and taken to recovery in satisfactory condition. Sponge, needle, and instrument counts were correct as reported to me.     Maria Guadalupe Barry MD  Ivana 10, 2022

## 2022-06-10 NOTE — INTERVAL H&P NOTE
Update History & Physical    The patient's History and Physical was reviewed with the patient and I examined the patient. There was no change. The surgical site was confirmed by the patient and me. Plan: The risks, benefits, expected outcome, and alternative to the recommended procedure have been discussed with the patient. Patient understands and wants to proceed with the procedure.      Cornelius Dunbar MD  6/10/2022

## 2022-06-10 NOTE — ANESTHESIA PRE PROCEDURE
Department of Anesthesiology  Preprocedure Note       Name:  Mary Huizar   Age:  40 y.o.  :  1985                                          MRN:  [id number]         Date:  6/10/2022      Surgeon: Kari Mccoy):  Yuli Whitaker MD    Procedure: Procedure(s): HERNIA INGUINAL REPAIR    Medications prior to admission:   Prior to Admission medications    Medication Sig Start Date End Date Taking? Authorizing Provider   pantoprazole (PROTONIX) 40 MG tablet Take 40 mg by mouth daily    Historical Provider, MD   amLODIPine (NORVASC) 5 MG tablet Take 1 tablet by mouth daily 22   Tra Hidalgo DO       Current medications:    Current Facility-Administered Medications   Medication Dose Route Frequency Provider Last Rate Last Admin    ceFAZolin (ANCEF) 2000 mg in sterile water 20 mL IV syringe  2,000 mg IntraVENous On Call Yuli Whitaker MD        HYDROmorphone HCl PF (DILAUDID) injection 0.25 mg  0.25 mg IntraVENous Once PRN Francesco Sorenson MD        Or   Umanzor HYDROmorphone HCl PF (DILAUDID) injection 0.5 mg  0.5 mg IntraVENous Once PRN Francesco Sorenson MD        lactated ringers infusion   IntraVENous Continuous Francesco Sorenson  mL/hr at 06/10/22 1410 New Bag at 06/10/22 1410    sodium chloride flush 0.9 % injection 5-40 mL  5-40 mL IntraVENous 2 times per day Francesco Sorenson MD        sodium chloride flush 0.9 % injection 5-40 mL  5-40 mL IntraVENous PRN Francesco Sorenson MD        0.9 % sodium chloride infusion   IntraVENous PRN Francesco Sorenson MD        midazolam PF (VERSED) injection 2 mg  2 mg IntraVENous Once PRN Francesco Sorenson MD           Allergies:  No Known Allergies    Problem List:    Patient Active Problem List   Diagnosis Code    New daily persistent headache G44.52    GERD (gastroesophageal reflux disease) K21.9    Overweight (BMI 25.0-29. 9) E66.3    Infectious gastroenteritis and colitis A09    Lymphadenopathy R59.1    Unilateral inguinal hernia, without obstruction or gangrene, recurrent K40.91       Past Medical History:        Diagnosis Date    GERD (gastroesophageal reflux disease)     Hypertension        Past Surgical History:  History reviewed. No pertinent surgical history. Social History:    Social History     Tobacco Use    Smoking status: Never Smoker    Smokeless tobacco: Never Used   Substance Use Topics    Alcohol use: No                                Counseling given: Not Answered      Vital Signs (Current):   Vitals:    06/09/22 0827 06/10/22 1351   BP:  (!) 145/103   Pulse:  78   Resp:  16   Temp:  97.7 °F (36.5 °C)   TempSrc:  Temporal   SpO2:  98%   Weight: 178 lb (80.7 kg) 178 lb 12.8 oz (81.1 kg)   Height: 5' 6\" (1.676 m)                                               BP Readings from Last 3 Encounters:   06/10/22 (!) 145/103   05/26/22 (!) 132/92   05/26/22 (!) 151/108       NPO Status:                                                                                 BMI:   Wt Readings from Last 3 Encounters:   06/10/22 178 lb 12.8 oz (81.1 kg)   05/26/22 183 lb (83 kg)   05/26/22 180 lb (81.6 kg)     Body mass index is 28.86 kg/m². CBC:   Lab Results   Component Value Date    WBC 6.3 05/26/2022    RBC 5.19 05/26/2022    HGB 14.4 05/26/2022    HCT 45.2 05/26/2022    MCV 87.1 05/26/2022    RDW 12.3 05/26/2022     05/26/2022       CMP:   Lab Results   Component Value Date     05/26/2022    K 3.9 05/26/2022     05/26/2022    CO2 28 05/26/2022    BUN 11 05/26/2022    CREATININE 1.20 05/26/2022    GFRAA >60 05/26/2022    AGRATIO 1.1 05/12/2022    LABGLOM >60 05/26/2022    GLUCOSE 103 05/26/2022    PROT 6.8 05/26/2022    CALCIUM 8.7 05/26/2022    BILITOT 0.2 05/26/2022    ALKPHOS 72 05/26/2022    ALKPHOS 80 05/12/2022    AST 17 05/26/2022    ALT 41 05/26/2022       POC Tests: No results for input(s): POCGLU, POCNA, POCK, POCCL, POCBUN, POCHEMO, POCHCT in the last 72 hours.     Coags: No results found for: PROTIME, INR, APTT    HCG (If Applicable): No results found for: PREGTESTUR, PREGSERUM, HCG, HCGQUANT     ABGs: No results found for: PHART, PO2ART, JEO4OVM, ARV8VTY, BEART, E8QXSJRJ     Type & Screen (If Applicable):  No results found for: LABABO, LABRH    Drug/Infectious Status (If Applicable):  No results found for: HIV, HEPCAB    COVID-19 Screening (If Applicable): No results found for: COVID19        Anesthesia Evaluation  Patient summary reviewed and Nursing notes reviewed  Airway: Mallampati: II  TM distance: >3 FB   Neck ROM: full     Dental:          Pulmonary:Negative Pulmonary ROS and normal exam                               Cardiovascular:  Exercise tolerance: good (>4 METS),   (+) hypertension:,         Rhythm: regular  Rate: normal                    Neuro/Psych:   Negative Neuro/Psych ROS              GI/Hepatic/Renal: Neg GI/Hepatic/Renal ROS  (+) GERD: well controlled,           Endo/Other: Negative Endo/Other ROS             Pt had no PAT visit       Abdominal:             Vascular: negative vascular ROS. Other Findings:           Anesthesia Plan      general     ASA 2       Induction: intravenous. Anesthetic plan and risks discussed with patient.       Plan discussed with surgical team.                    Arby Meigs, MD   6/10/2022

## 2022-06-11 NOTE — ANESTHESIA POSTPROCEDURE EVALUATION
Department of Anesthesiology  Postprocedure Note    Patient: Rody Gomez  MRN: [id number]  YOB: 1985  Date of evaluation: 6/10/2022  Time:  8:57 PM     Procedure Summary     Date: 06/10/22 [place] [facility] [place] [facility] [place] [facility] [place]    Anesthesia Start: 1735 Anesthesia Stop: 1913    Procedure: HERNIA INGUINAL REPAIR WITH MESH (Left Groin) Diagnosis:       Inguinal hernia      (LEFT INGUINAL HERNIA)    Surgeons: Rosendo Lutz MD Responsible Provider: Alisson Ahmadi MD    Anesthesia Type: General, general ASA Status: 2          Anesthesia Type: General, general    Camryn Phase I: Camryn Score: 10    Camryn Phase II: Camryn Score: 10    Last vitals: Reviewed and per EMR flowsheets.        Anesthesia Post Evaluation    Patient location during evaluation: PACU  Patient participation: complete - patient participated  Level of consciousness: awake and alert  Airway patency: patent  Nausea & Vomiting: no nausea and no vomiting  Complications: no  Cardiovascular status: hemodynamically stable  Respiratory status: acceptable, nonlabored ventilation and spontaneous ventilation  Hydration status: euvolemic  Comments: BP (!) 140/93   Pulse 70   Temp 97 °F (36.1 °C) (Temporal)   Resp 12   Ht 5' 6\" (1.676 m)   Wt 178 lb 12.8 oz (81.1 kg)   SpO2 100%   BMI 28.86 kg/m²     Multimodal analgesia pain management approach

## 2022-06-23 ENCOUNTER — OFFICE VISIT (OUTPATIENT)
Dept: SURGERY | Age: 37
End: 2022-06-23

## 2022-06-23 DIAGNOSIS — K40.90 LEFT INGUINAL HERNIA: Primary | ICD-10-CM

## 2022-06-23 PROCEDURE — 99024 POSTOP FOLLOW-UP VISIT: CPT | Performed by: SURGERY

## 2022-07-18 NOTE — PROGRESS NOTES
S/p [facility] repair 6/10  Mildly sore, no concerns    Exam-  NAD  Left groin withi appropriate sub-incisional swelling, poss small seroma  Incision intact, no cellulitis/hematoma/ecchymosis. Imp:  S/p [facility] repair  Discussed return to activities. Follow-up and Dispositions    Return for as needed.

## 2022-08-11 ENCOUNTER — HOSPITAL ENCOUNTER (OUTPATIENT)
Dept: LAB | Age: 37
Discharge: HOME | End: 2022-08-14

## 2022-08-11 PROCEDURE — 88305 TISSUE EXAM BY PATHOLOGIST: CPT

## 2022-08-22 ENCOUNTER — PREP FOR PROCEDURE (OUTPATIENT)
Dept: ADMINISTRATIVE | Age: 37
End: 2022-08-22

## 2022-08-22 RX ORDER — LIDOCAINE HYDROCHLORIDE 20 MG/ML
15 SOLUTION OROPHARYNGEAL ONCE
OUTPATIENT
Start: 2022-08-22 | End: 2022-08-22

## 2022-08-22 NOTE — PROGRESS NOTES
Informed patient of 0900 arrival time for 0930 procedure. Informed patient of COVID protocols. Informed patient that they must have a  with them the entire time that they are in the hospital. Patient verbalized understanding.

## 2022-08-23 ENCOUNTER — HOSPITAL ENCOUNTER (OUTPATIENT)
Dept: ENDOSCOPY | Age: 37
Setting detail: OUTPATIENT SURGERY
Discharge: HOME | End: 2022-08-26
Payer: COMMERCIAL

## 2022-08-23 PROCEDURE — 6370000000 HC RX 637 (ALT 250 FOR IP): Performed by: STUDENT IN AN ORGANIZED HEALTH CARE EDUCATION/TRAINING PROGRAM

## 2022-08-23 PROCEDURE — 2709999900 HC NON-CHARGEABLE SUPPLY

## 2022-08-23 PROCEDURE — 3609015500 HC GASTRIC/DUODENAL MOTILITY &/OR MANOMETRY STUDY

## 2022-08-23 RX ORDER — LIDOCAINE HYDROCHLORIDE 20 MG/ML
15 SOLUTION OROPHARYNGEAL
Status: DISCONTINUED | OUTPATIENT
Start: 2022-08-23 | End: 2022-08-27 | Stop reason: HOSPADM

## 2022-08-23 RX ADMIN — LIDOCAINE HYDROCHLORIDE 15 ML: 20 SOLUTION ORAL at 09:40

## 2022-08-24 NOTE — PROCEDURES
[facility]  PROCEDURE NOTE    Name:  Cristina Iraheta  MR#:  [id number]  :  1985  ACCOUNT #:  [de-identified]  DATE OF SERVICE:  2022    PREOPERATIVE DIAGNOSIS:  Dysphagia. POSTOPERATIVE DIAGNOSES:  1.  Borderline low LES pressure at 11 mmHg. 2.  Hiatal hernia present - see below. 3.  90% of peristalsis was inadequate with DCI averaging only 172.  4.  Only 20% of liquid and 20% of viscous swallows showed complete bolus transit. PROCEDURE PERFORMED:  High-resolution impedance manometry. PROCEDURE:  After obtaining informed consent, the patient underwent nasal intubation with the high-resolution impedance manometry probe. The lower esophageal sphincter was located between 41.4 and 42.9 cm from the nares. There was a 2.8 cm hiatal hernia present. The LES pressure averaged 11 mmHg which is at the low end of normal.  This was mid respiration. With wet swallows, the IRP averaged 8 mmHg. Out of 10 wet swallows, 8 were either weak or failed outright. The DCI was quite low, even on the more normal swallows averaging 172 overall. Impedance revealed 20% of liquid and 20% of viscous swallows with complete bolus transit. DISPOSITION:  Further followup per Dr. Elder Barros. This appears to be a nonspecific motility disturbance. No evidence of outflow obstruction. ADDENDUM:  The UES pressure was 310 which is quite high. Normal range .         Forestine Boast, MD [id number]  D:  2022 10:59  T:  2022 12:27  JOB #:  [id number]

## 2022-10-04 DIAGNOSIS — K29.70 GASTRITIS WITHOUT BLEEDING, UNSPECIFIED CHRONICITY, UNSPECIFIED GASTRITIS TYPE: Primary | ICD-10-CM

## 2023-04-25 ENCOUNTER — OFFICE VISIT (OUTPATIENT)
Dept: INTERNAL MEDICINE CLINIC | Facility: CLINIC | Age: 38
End: 2023-04-25
Payer: COMMERCIAL

## 2023-04-25 VITALS
HEIGHT: 66 IN | OXYGEN SATURATION: 98 % | BODY MASS INDEX: 24.01 KG/M2 | SYSTOLIC BLOOD PRESSURE: 120 MMHG | DIASTOLIC BLOOD PRESSURE: 90 MMHG | TEMPERATURE: 97.9 F | WEIGHT: 149.4 LBS | HEART RATE: 83 BPM

## 2023-04-25 DIAGNOSIS — R03.0 ELEVATED BP WITHOUT DIAGNOSIS OF HYPERTENSION: ICD-10-CM

## 2023-04-25 DIAGNOSIS — R63.4 WEIGHT LOSS, ABNORMAL: Primary | ICD-10-CM

## 2023-04-25 PROCEDURE — 99214 OFFICE O/P EST MOD 30 MIN: CPT | Performed by: INTERNAL MEDICINE

## 2023-04-25 SDOH — ECONOMIC STABILITY: FOOD INSECURITY: WITHIN THE PAST 12 MONTHS, YOU WORRIED THAT YOUR FOOD WOULD RUN OUT BEFORE YOU GOT MONEY TO BUY MORE.: NEVER TRUE

## 2023-04-25 SDOH — ECONOMIC STABILITY: HOUSING INSECURITY
IN THE LAST 12 MONTHS, WAS THERE A TIME WHEN YOU DID NOT HAVE A STEADY PLACE TO SLEEP OR SLEPT IN A SHELTER (INCLUDING NOW)?: NO

## 2023-04-25 SDOH — ECONOMIC STABILITY: INCOME INSECURITY: HOW HARD IS IT FOR YOU TO PAY FOR THE VERY BASICS LIKE FOOD, HOUSING, MEDICAL CARE, AND HEATING?: SOMEWHAT HARD

## 2023-04-25 SDOH — ECONOMIC STABILITY: FOOD INSECURITY: WITHIN THE PAST 12 MONTHS, THE FOOD YOU BOUGHT JUST DIDN'T LAST AND YOU DIDN'T HAVE MONEY TO GET MORE.: NEVER TRUE

## 2023-04-25 ASSESSMENT — PATIENT HEALTH QUESTIONNAIRE - PHQ9
SUM OF ALL RESPONSES TO PHQ QUESTIONS 1-9: 0
2. FEELING DOWN, DEPRESSED OR HOPELESS: 0
SUM OF ALL RESPONSES TO PHQ QUESTIONS 1-9: 0
SUM OF ALL RESPONSES TO PHQ QUESTIONS 1-9: 0
SUM OF ALL RESPONSES TO PHQ9 QUESTIONS 1 & 2: 0
SUM OF ALL RESPONSES TO PHQ QUESTIONS 1-9: 0
1. LITTLE INTEREST OR PLEASURE IN DOING THINGS: 0

## 2023-04-25 ASSESSMENT — ENCOUNTER SYMPTOMS
CHEST TIGHTNESS: 0
SHORTNESS OF BREATH: 0
TROUBLE SWALLOWING: 0

## 2023-04-25 NOTE — PROGRESS NOTES
Jay Tatum was seen today for weight loss. Diagnoses and all orders for this visit:    Weight loss, abnormal  -     CBC with Auto Differential; Future  -     Comprehensive Metabolic Panel; Future  -     TSH; Future  -     Vitamin B12; Future  -     Vitamin D 25 Hydroxy; Future  -     Hemoglobin A1C; Future  -     Lipid Panel; Future          Fabienne Nguyen is a 40 y.o. male    Chief Complaint   Patient presents with    Weight Loss   Past 3 months  Had nissen procedure . Wife says eating and drinking boost.  Not having stomach pain. Feels fine    Office Visit on 05/26/2022   Component Date Value Ref Range Status    IgA 05/26/2022 259  90 - 386 mg/dL Final    Comment: (NOTE)  Performed At: [facility]  [employer] [address] [id number]  Vijay Royal MD [id number]      Gliadin Antibodies IgA 05/26/2022 3  0 - 19 units Final    Comment: (NOTE)                    Negative                   0 - 19                    Weak Positive             20 - 30                    Moderate to Strong Positive   >30      Gliadin Antibodies IgG 05/26/2022 1  0 - 19 units Final    Comment: (NOTE)                    Negative                   0 - 19                    Weak Positive             20 - 30                    Moderate to Strong Positive   >30  Performed At: [facility]  [employer] [address] [id number]  Vijay Royal MD [id number]:[id number]      Tissue Transglutaminase IgA 05/26/2022 <2  0 - 3 U/mL Final    Comment: (NOTE)                               Negative        0 -  3                               Weak Positive   4 - 10                               Positive           >10  Tissue Transglutaminase (tTG) has been identified  as the endomysial antigen. Studies have demonstr-  ated that endomysial IgA antibodies have over 99%  specificity for gluten sensitive enteropathy.       TTG, IgG 05/26/2022 <2  0 - 5 U/mL Final    Comment: (NOTE)                               Negative        0 -

## 2023-04-26 ENCOUNTER — NURSE ONLY (OUTPATIENT)
Dept: INTERNAL MEDICINE CLINIC | Facility: CLINIC | Age: 38
End: 2023-04-26

## 2023-04-26 DIAGNOSIS — R63.4 WEIGHT LOSS, ABNORMAL: ICD-10-CM

## 2023-04-26 LAB
BASOPHILS # BLD: 0.1 K/UL (ref 0–0.2)
BASOPHILS NFR BLD: 2 % (ref 0–2)
DIFFERENTIAL METHOD BLD: NORMAL
EOSINOPHIL # BLD: 0.2 K/UL (ref 0–0.8)
EOSINOPHIL NFR BLD: 3 % (ref 0.5–7.8)
ERYTHROCYTE [DISTWIDTH] IN BLOOD BY AUTOMATED COUNT: 12.3 % (ref 11.9–14.6)
HCT VFR BLD AUTO: 45.1 % (ref 41.1–50.3)
HGB BLD-MCNC: 14.2 G/DL (ref 13.6–17.2)
IMM GRANULOCYTES # BLD AUTO: 0 K/UL (ref 0–0.5)
IMM GRANULOCYTES NFR BLD AUTO: 0 % (ref 0–5)
LYMPHOCYTES # BLD: 2 K/UL (ref 0.5–4.6)
LYMPHOCYTES NFR BLD: 28 % (ref 13–44)
MCH RBC QN AUTO: 28.6 PG (ref 26.1–32.9)
MCHC RBC AUTO-ENTMCNC: 31.5 G/DL (ref 31.4–35)
MCV RBC AUTO: 90.7 FL (ref 82–102)
MONOCYTES # BLD: 0.5 K/UL (ref 0.1–1.3)
MONOCYTES NFR BLD: 7 % (ref 4–12)
NEUTS SEG # BLD: 4.3 K/UL (ref 1.7–8.2)
NEUTS SEG NFR BLD: 60 % (ref 43–78)
NRBC # BLD: 0 K/UL (ref 0–0.2)
PLATELET # BLD AUTO: 268 K/UL (ref 150–450)
PMV BLD AUTO: 10.7 FL (ref 9.4–12.3)
RBC # BLD AUTO: 4.97 M/UL (ref 4.23–5.6)
WBC # BLD AUTO: 7.3 K/UL (ref 4.3–11.1)

## 2023-04-27 LAB
25(OH)D3 SERPL-MCNC: 30.8 NG/ML (ref 30–100)
ALBUMIN SERPL-MCNC: 3.9 G/DL (ref 3.5–5)
ALBUMIN/GLOB SERPL: 1.3 (ref 0.4–1.6)
ALP SERPL-CCNC: 74 U/L (ref 50–136)
ALT SERPL-CCNC: 22 U/L (ref 12–65)
ANION GAP SERPL CALC-SCNC: 3 MMOL/L (ref 2–11)
AST SERPL-CCNC: 11 U/L (ref 15–37)
BILIRUB SERPL-MCNC: 0.4 MG/DL (ref 0.2–1.1)
BUN SERPL-MCNC: 12 MG/DL (ref 6–23)
CALCIUM SERPL-MCNC: 9.1 MG/DL (ref 8.3–10.4)
CHLORIDE SERPL-SCNC: 106 MMOL/L (ref 101–110)
CHOLEST SERPL-MCNC: 121 MG/DL
CO2 SERPL-SCNC: 29 MMOL/L (ref 21–32)
CREAT SERPL-MCNC: 1 MG/DL (ref 0.8–1.5)
EST. AVERAGE GLUCOSE BLD GHB EST-MCNC: 108 MG/DL
GLOBULIN SER CALC-MCNC: 2.9 G/DL (ref 2.8–4.5)
GLUCOSE SERPL-MCNC: 75 MG/DL (ref 65–100)
HBA1C MFR BLD: 5.4 % (ref 4.8–5.6)
HDLC SERPL-MCNC: 40 MG/DL (ref 40–60)
HDLC SERPL: 3
LDLC SERPL CALC-MCNC: 69.4 MG/DL
POTASSIUM SERPL-SCNC: 3.7 MMOL/L (ref 3.5–5.1)
PROT SERPL-MCNC: 6.8 G/DL (ref 6.3–8.2)
SODIUM SERPL-SCNC: 138 MMOL/L (ref 133–143)
TRIGL SERPL-MCNC: 58 MG/DL (ref 35–150)
TSH, 3RD GENERATION: 3.32 UIU/ML (ref 0.36–3.74)
VIT B12 SERPL-MCNC: 668 PG/ML (ref 193–986)
VLDLC SERPL CALC-MCNC: 11.6 MG/DL (ref 6–23)

## 2023-08-25 ENCOUNTER — TELEPHONE (OUTPATIENT)
Dept: INTERNAL MEDICINE CLINIC | Facility: CLINIC | Age: 38
End: 2023-08-25

## 2023-08-31 ENCOUNTER — HOSPITAL ENCOUNTER (EMERGENCY)
Age: 38
Discharge: HOME | End: 2023-08-31
Attending: EMERGENCY MEDICINE
Payer: COMMERCIAL

## 2023-08-31 ENCOUNTER — APPOINTMENT (OUTPATIENT)
Dept: GENERAL RADIOLOGY | Age: 38
End: 2023-08-31
Payer: COMMERCIAL

## 2023-08-31 VITALS
DIASTOLIC BLOOD PRESSURE: 83 MMHG | HEART RATE: 97 BPM | BODY MASS INDEX: 19.93 KG/M2 | RESPIRATION RATE: 16 BRPM | SYSTOLIC BLOOD PRESSURE: 114 MMHG | OXYGEN SATURATION: 98 % | TEMPERATURE: 98.7 F | HEIGHT: 66 IN | WEIGHT: 124 LBS

## 2023-08-31 DIAGNOSIS — K94.23 PEG TUBE MALFUNCTION (HCC): Primary | ICD-10-CM

## 2023-08-31 LAB
ALBUMIN SERPL-MCNC: 2.9 G/DL (ref 3.5–5)
ALBUMIN/GLOB SERPL: 0.8 (ref 0.4–1.6)
ALP SERPL-CCNC: 65 U/L (ref 50–136)
ALT SERPL-CCNC: 52 U/L (ref 12–65)
ANION GAP SERPL CALC-SCNC: 5 MMOL/L (ref 2–11)
AST SERPL-CCNC: 22 U/L (ref 15–37)
BASOPHILS # BLD: 0.1 K/UL (ref 0–0.2)
BASOPHILS NFR BLD: 1 % (ref 0–2)
BILIRUB SERPL-MCNC: 0.2 MG/DL (ref 0.2–1.1)
BUN SERPL-MCNC: 17 MG/DL (ref 6–23)
CALCIUM SERPL-MCNC: 9 MG/DL (ref 8.3–10.4)
CHLORIDE SERPL-SCNC: 105 MMOL/L (ref 101–110)
CO2 SERPL-SCNC: 31 MMOL/L (ref 21–32)
CREAT SERPL-MCNC: 0.87 MG/DL (ref 0.8–1.5)
DIFFERENTIAL METHOD BLD: ABNORMAL
EOSINOPHIL # BLD: 0.3 K/UL (ref 0–0.8)
EOSINOPHIL NFR BLD: 6 % (ref 0.5–7.8)
ERYTHROCYTE [DISTWIDTH] IN BLOOD BY AUTOMATED COUNT: 12.7 % (ref 11.9–14.6)
GLOBULIN SER CALC-MCNC: 3.7 G/DL (ref 2.8–4.5)
GLUCOSE SERPL-MCNC: 93 MG/DL (ref 65–100)
HCT VFR BLD AUTO: 38.7 % (ref 41.1–50.3)
HGB BLD-MCNC: 12.3 G/DL (ref 13.6–17.2)
IMM GRANULOCYTES # BLD AUTO: 0 K/UL (ref 0–0.5)
IMM GRANULOCYTES NFR BLD AUTO: 1 % (ref 0–5)
LIPASE SERPL-CCNC: 158 U/L (ref 73–393)
LYMPHOCYTES # BLD: 1.1 K/UL (ref 0.5–4.6)
LYMPHOCYTES NFR BLD: 21 % (ref 13–44)
MCH RBC QN AUTO: 28.6 PG (ref 26.1–32.9)
MCHC RBC AUTO-ENTMCNC: 31.8 G/DL (ref 31.4–35)
MCV RBC AUTO: 90 FL (ref 82–102)
MONOCYTES # BLD: 0.7 K/UL (ref 0.1–1.3)
MONOCYTES NFR BLD: 14 % (ref 4–12)
NEUTS SEG # BLD: 3.1 K/UL (ref 1.7–8.2)
NEUTS SEG NFR BLD: 58 % (ref 43–78)
NRBC # BLD: 0 K/UL (ref 0–0.2)
PLATELET # BLD AUTO: 306 K/UL (ref 150–450)
PMV BLD AUTO: 9.6 FL (ref 9.4–12.3)
POTASSIUM SERPL-SCNC: 3.9 MMOL/L (ref 3.5–5.1)
PROT SERPL-MCNC: 6.6 G/DL (ref 6.3–8.2)
RBC # BLD AUTO: 4.3 M/UL (ref 4.23–5.6)
SODIUM SERPL-SCNC: 141 MMOL/L (ref 133–143)
WBC # BLD AUTO: 5.4 K/UL (ref 4.3–11.1)

## 2023-08-31 PROCEDURE — 85025 COMPLETE CBC W/AUTO DIFF WBC: CPT

## 2023-08-31 PROCEDURE — 74022 RADEX COMPL AQT ABD SERIES: CPT

## 2023-08-31 PROCEDURE — 99284 EMERGENCY DEPT VISIT MOD MDM: CPT

## 2023-08-31 PROCEDURE — 80053 COMPREHEN METABOLIC PANEL: CPT

## 2023-08-31 PROCEDURE — 83690 ASSAY OF LIPASE: CPT

## 2023-08-31 RX ORDER — POLYETHYLENE GLYCOL 3350 17 G/17G
POWDER, FOR SOLUTION ORAL
COMMUNITY
Start: 2023-08-23

## 2023-08-31 RX ORDER — METHOCARBAMOL 500 MG/1
TABLET, FILM COATED ORAL
COMMUNITY
Start: 2023-08-22

## 2023-08-31 RX ORDER — POLYETHYLENE GLYCOL 3350
17 POWDER (GRAM) MISCELLANEOUS DAILY PRN
COMMUNITY
Start: 2023-08-23

## 2023-08-31 RX ORDER — OXYCODONE HYDROCHLORIDE 5 MG/1
TABLET ORAL
COMMUNITY
Start: 2023-08-22

## 2023-08-31 ASSESSMENT — PAIN - FUNCTIONAL ASSESSMENT: PAIN_FUNCTIONAL_ASSESSMENT: NONE - DENIES PAIN

## 2023-08-31 ASSESSMENT — LIFESTYLE VARIABLES
HOW MANY STANDARD DRINKS CONTAINING ALCOHOL DO YOU HAVE ON A TYPICAL DAY: PATIENT DOES NOT DRINK
HOW OFTEN DO YOU HAVE A DRINK CONTAINING ALCOHOL: NEVER

## 2023-08-31 NOTE — ED TRIAGE NOTES
Possible infection around g-tube,  Tube recently inserted around 9 days ago. Surgeon in [facility].

## 2023-09-01 NOTE — ED PROVIDER NOTES
(Non-Medical): No   Housing Stability: Unknown    Unstable Housing in the Last Year: No        Discharge Medication List as of 8/31/2023 11:39 PM        CONTINUE these medications which have NOT CHANGED    Details   Polyethylene Glycol 3350 POWD DAILY PRN Starting Wed 8/23/2023, Historical Med      methocarbamol (ROBAXIN) 500 MG tablet Historical Med      oxyCODONE (ROXICODONE) 5 MG immediate release tablet Historical Med      MIRALAX 17 g packet DAWHistorical Med      pantoprazole (PROTONIX) 40 MG tablet Take 1 tablet by mouth dailyHistorical Med      amLODIPine (NORVASC) 5 MG tablet Take 1 tablet by mouth daily, Disp-90 tablet, R-1Normal              Results for orders placed or performed during the hospital encounter of 08/31/23   XR ACUTE ABD SERIES CHEST 1 VW    Narrative    EXAMINATION: PA Chest with Supine and Upright Abdomen. DATE: 8/31/2023. COMPARISON: None available. CLINICAL HISTORY: Constipation with drainage around G-tube. TECHNIQUE: Single view chest with supine and upright abdomen. FINDINGS:    CHEST:     The lungs are clear. .  The cardiomediastinal silhouette and pulmonary vessels are within normal limits. ABDOMEN:    Mixed gas and stool is seen in nondilated loops of large bowel to the level of   the rectum. Gastric tube is seen within the left upper quadrant. There is suggestion of a small amount of free air under the diaphragm. There is no mass effect or suspicious calcifications. Impression    1. Small amount of free air under the diaphragm. These findings were discussed   with Dr. Rafiq Li and was noted of this patient had recent surgery which is   likely the cause. 2.   Nonobstructive bowel gas pattern  3. No radiographic evidence of an acute cardiopulmonary process.      Robbi Lindsey D.O.   9/1/2023 12:27:00 AM   CBC with Auto Differential   Result Value Ref Range    WBC 5.4 4.3 - 11.1 K/uL    RBC 4.30 4.23 - 5.6 M/uL    Hemoglobin 12.3 (L) 13.6 - 17.2 g/dL

## 2023-09-01 NOTE — DISCHARGE INSTRUCTIONS
If you are having pain with tube feeds then hold them until you discuss with your provider at [address].

## 2023-09-01 NOTE — ED NOTES
Per patient's wife, patient last feed was midnight 8/31.  Patient c/o pain and leaking around G tube      Celestina Marcano RN  08/31/23 2015

## 2024-03-04 ENCOUNTER — OFFICE VISIT (OUTPATIENT)
Dept: INTERNAL MEDICINE CLINIC | Facility: CLINIC | Age: 39
End: 2024-03-04
Payer: COMMERCIAL

## 2024-03-04 VITALS
DIASTOLIC BLOOD PRESSURE: 88 MMHG | WEIGHT: 152 LBS | BODY MASS INDEX: 24.43 KG/M2 | HEART RATE: 96 BPM | HEIGHT: 66 IN | SYSTOLIC BLOOD PRESSURE: 112 MMHG | OXYGEN SATURATION: 100 % | TEMPERATURE: 98.9 F

## 2024-03-04 DIAGNOSIS — G44.52 NEW DAILY PERSISTENT HEADACHE: Primary | ICD-10-CM

## 2024-03-04 DIAGNOSIS — I10 PRIMARY HYPERTENSION: ICD-10-CM

## 2024-03-04 PROCEDURE — 99214 OFFICE O/P EST MOD 30 MIN: CPT | Performed by: INTERNAL MEDICINE

## 2024-03-04 PROCEDURE — 3074F SYST BP LT 130 MM HG: CPT | Performed by: INTERNAL MEDICINE

## 2024-03-04 PROCEDURE — 3079F DIAST BP 80-89 MM HG: CPT | Performed by: INTERNAL MEDICINE

## 2024-03-04 RX ORDER — AMLODIPINE BESYLATE 5 MG/1
5 TABLET ORAL DAILY
Qty: 90 TABLET | Refills: 3 | Status: SHIPPED | OUTPATIENT
Start: 2024-03-04

## 2024-03-04 ASSESSMENT — PATIENT HEALTH QUESTIONNAIRE - PHQ9
2. FEELING DOWN, DEPRESSED OR HOPELESS: 0
SUM OF ALL RESPONSES TO PHQ QUESTIONS 1-9: 0
1. LITTLE INTEREST OR PLEASURE IN DOING THINGS: NOT AT ALL
SUM OF ALL RESPONSES TO PHQ QUESTIONS 1-9: 0
SUM OF ALL RESPONSES TO PHQ9 QUESTIONS 1 & 2: 0
SUM OF ALL RESPONSES TO PHQ QUESTIONS 1-9: 0
SUM OF ALL RESPONSES TO PHQ9 QUESTIONS 1 & 2: 0
SUM OF ALL RESPONSES TO PHQ QUESTIONS 1-9: 0
1. LITTLE INTEREST OR PLEASURE IN DOING THINGS: 0
2. FEELING DOWN, DEPRESSED OR HOPELESS: NOT AT ALL

## 2024-03-04 NOTE — PROGRESS NOTES
Systems   Constitutional:  Negative for chills and fever.   HENT:  Negative for dental problem and trouble swallowing.    Respiratory:  Negative for chest tightness and shortness of breath.    Genitourinary:  Negative for difficulty urinating and dysuria.   Neurological:  Positive for headaches. Negative for syncope and light-headedness.         Vitals:    03/04/24 1559 03/04/24 1606   BP: (!) 126/94 112/88   Pulse: 92 96   Temp: 98.9 °F (37.2 °C)    TempSrc: Temporal    SpO2: 100%    Weight: 68.9 kg (152 lb)    Height: 1.676 m (5' 6\")        Wt Readings from Last 3 Encounters:   03/04/24 68.9 kg (152 lb)   08/31/23 56.2 kg (124 lb)   04/25/23 67.8 kg (149 lb 6.4 oz)         Physical Exam  Constitutional:       General: He is not in acute distress.     Appearance: He is not ill-appearing.   HENT:      Head: Normocephalic and atraumatic.      Nose: Nose normal.   Eyes:      General: No scleral icterus.     Extraocular Movements: Extraocular movements intact.      Conjunctiva/sclera: Conjunctivae normal.   Cardiovascular:      Rate and Rhythm: Normal rate and regular rhythm.      Pulses: Normal pulses.      Heart sounds: Normal heart sounds.   Pulmonary:      Effort: Pulmonary effort is normal. No respiratory distress.      Breath sounds: Normal breath sounds.   Abdominal:      General: Abdomen is flat. Bowel sounds are normal. There is no distension.      Palpations: Abdomen is soft. There is no mass.      Tenderness: There is no abdominal tenderness. There is no guarding.   Musculoskeletal:      Cervical back: Neck supple. No rigidity.   Skin:     Coloration: Skin is not jaundiced.      Findings: No lesion or rash.   Neurological:      General: No focal deficit present.      Mental Status: He is alert. Mental status is at baseline.   Psychiatric:         Mood and Affect: Mood normal.         Thought Content: Thought content normal.            Jeevan was seen today for hypertension and headache.    Diagnoses and all

## 2024-03-08 ASSESSMENT — ENCOUNTER SYMPTOMS
TROUBLE SWALLOWING: 0
SHORTNESS OF BREATH: 0
CHEST TIGHTNESS: 0

## 2024-09-24 ENCOUNTER — OFFICE VISIT (OUTPATIENT)
Dept: INTERNAL MEDICINE CLINIC | Facility: CLINIC | Age: 39
End: 2024-09-24
Payer: COMMERCIAL

## 2024-09-24 VITALS
HEIGHT: 66 IN | DIASTOLIC BLOOD PRESSURE: 80 MMHG | OXYGEN SATURATION: 100 % | SYSTOLIC BLOOD PRESSURE: 130 MMHG | HEART RATE: 82 BPM | TEMPERATURE: 97.5 F | BODY MASS INDEX: 25.55 KG/M2 | WEIGHT: 159 LBS

## 2024-09-24 DIAGNOSIS — F32.1 CURRENT MODERATE EPISODE OF MAJOR DEPRESSIVE DISORDER, UNSPECIFIED WHETHER RECURRENT (HCC): Primary | ICD-10-CM

## 2024-09-24 PROCEDURE — 99214 OFFICE O/P EST MOD 30 MIN: CPT | Performed by: INTERNAL MEDICINE

## 2024-09-24 RX ORDER — TRIAMCINOLONE ACETONIDE 5 MG/G
CREAM TOPICAL 2 TIMES DAILY
COMMUNITY
Start: 2024-08-08

## 2024-09-24 RX ORDER — ESCITALOPRAM OXALATE 10 MG/1
10 TABLET ORAL DAILY
Qty: 30 TABLET | Refills: 0 | Status: SHIPPED | OUTPATIENT
Start: 2024-09-24

## 2024-09-24 SDOH — ECONOMIC STABILITY: FOOD INSECURITY: WITHIN THE PAST 12 MONTHS, YOU WORRIED THAT YOUR FOOD WOULD RUN OUT BEFORE YOU GOT MONEY TO BUY MORE.: NEVER TRUE

## 2024-09-24 SDOH — ECONOMIC STABILITY: INCOME INSECURITY: HOW HARD IS IT FOR YOU TO PAY FOR THE VERY BASICS LIKE FOOD, HOUSING, MEDICAL CARE, AND HEATING?: NOT VERY HARD

## 2024-09-24 SDOH — ECONOMIC STABILITY: FOOD INSECURITY: WITHIN THE PAST 12 MONTHS, THE FOOD YOU BOUGHT JUST DIDN'T LAST AND YOU DIDN'T HAVE MONEY TO GET MORE.: NEVER TRUE

## 2024-09-24 ASSESSMENT — PATIENT HEALTH QUESTIONNAIRE - PHQ9
7. TROUBLE CONCENTRATING ON THINGS, SUCH AS READING THE NEWSPAPER OR WATCHING TELEVISION: NEARLY EVERY DAY
SUM OF ALL RESPONSES TO PHQ QUESTIONS 1-9: 19
10. IF YOU CHECKED OFF ANY PROBLEMS, HOW DIFFICULT HAVE THESE PROBLEMS MADE IT FOR YOU TO DO YOUR WORK, TAKE CARE OF THINGS AT HOME, OR GET ALONG WITH OTHER PEOPLE: VERY DIFFICULT
6. FEELING BAD ABOUT YOURSELF - OR THAT YOU ARE A FAILURE OR HAVE LET YOURSELF OR YOUR FAMILY DOWN: MORE THAN HALF THE DAYS
SUM OF ALL RESPONSES TO PHQ QUESTIONS 1-9: 19
3. TROUBLE FALLING OR STAYING ASLEEP: NEARLY EVERY DAY
8. MOVING OR SPEAKING SO SLOWLY THAT OTHER PEOPLE COULD HAVE NOTICED. OR THE OPPOSITE, BEING SO FIGETY OR RESTLESS THAT YOU HAVE BEEN MOVING AROUND A LOT MORE THAN USUAL: NEARLY EVERY DAY
SUM OF ALL RESPONSES TO PHQ QUESTIONS 1-9: 19
2. FEELING DOWN, DEPRESSED OR HOPELESS: NEARLY EVERY DAY
5. POOR APPETITE OR OVEREATING: MORE THAN HALF THE DAYS
9. THOUGHTS THAT YOU WOULD BE BETTER OFF DEAD, OR OF HURTING YOURSELF: NOT AT ALL
1. LITTLE INTEREST OR PLEASURE IN DOING THINGS: NOT AT ALL
4. FEELING TIRED OR HAVING LITTLE ENERGY: NEARLY EVERY DAY
SUM OF ALL RESPONSES TO PHQ9 QUESTIONS 1 & 2: 3
SUM OF ALL RESPONSES TO PHQ QUESTIONS 1-9: 19

## 2024-09-24 ASSESSMENT — ANXIETY QUESTIONNAIRES
1. FEELING NERVOUS, ANXIOUS, OR ON EDGE: NEARLY EVERY DAY
6. BECOMING EASILY ANNOYED OR IRRITABLE: NEARLY EVERY DAY
7. FEELING AFRAID AS IF SOMETHING AWFUL MIGHT HAPPEN: MORE THAN HALF THE DAYS
GAD7 TOTAL SCORE: 19
2. NOT BEING ABLE TO STOP OR CONTROL WORRYING: NEARLY EVERY DAY
IF YOU CHECKED OFF ANY PROBLEMS ON THIS QUESTIONNAIRE, HOW DIFFICULT HAVE THESE PROBLEMS MADE IT FOR YOU TO DO YOUR WORK, TAKE CARE OF THINGS AT HOME, OR GET ALONG WITH OTHER PEOPLE: VERY DIFFICULT
4. TROUBLE RELAXING: NEARLY EVERY DAY
3. WORRYING TOO MUCH ABOUT DIFFERENT THINGS: NEARLY EVERY DAY
5. BEING SO RESTLESS THAT IT IS HARD TO SIT STILL: MORE THAN HALF THE DAYS

## 2024-09-24 NOTE — PROGRESS NOTES
leg: No edema.      Left lower leg: No edema.   Skin:     Coloration: Skin is not jaundiced.      Findings: No lesion or rash.   Neurological:      General: No focal deficit present.      Mental Status: He is alert. Mental status is at baseline.   Psychiatric:         Mood and Affect: Mood normal.         Thought Content: Thought content normal.            Jeevan was seen today for mental health problem.    Diagnoses and all orders for this visit:    Current moderate episode of major depressive disorder, unspecified whether recurrent (HCC)    Other orders  -     escitalopram (LEXAPRO) 10 MG tablet; Take 1 tablet by mouth daily                 Ricardo Edwards DO

## 2024-10-24 ENCOUNTER — OFFICE VISIT (OUTPATIENT)
Dept: INTERNAL MEDICINE CLINIC | Facility: CLINIC | Age: 39
End: 2024-10-24
Payer: COMMERCIAL

## 2024-10-24 VITALS
DIASTOLIC BLOOD PRESSURE: 88 MMHG | HEART RATE: 91 BPM | OXYGEN SATURATION: 99 % | TEMPERATURE: 98.7 F | HEIGHT: 66 IN | WEIGHT: 167 LBS | SYSTOLIC BLOOD PRESSURE: 120 MMHG | BODY MASS INDEX: 26.84 KG/M2

## 2024-10-24 DIAGNOSIS — F32.1 CURRENT MODERATE EPISODE OF MAJOR DEPRESSIVE DISORDER, UNSPECIFIED WHETHER RECURRENT (HCC): Primary | ICD-10-CM

## 2024-10-24 PROCEDURE — 99213 OFFICE O/P EST LOW 20 MIN: CPT | Performed by: INTERNAL MEDICINE

## 2024-10-24 RX ORDER — ESCITALOPRAM OXALATE 10 MG/1
10 TABLET ORAL DAILY
Qty: 30 TABLET | Refills: 0 | OUTPATIENT
Start: 2024-10-24

## 2024-10-24 RX ORDER — ESCITALOPRAM OXALATE 10 MG/1
10 TABLET ORAL DAILY
Qty: 90 TABLET | Refills: 3 | Status: SHIPPED | OUTPATIENT
Start: 2024-10-24

## 2024-10-24 NOTE — PROGRESS NOTES
There are no diagnoses linked to this encounter.      David Abercrombie is a 39 y.o. male    Chief Complaint   Patient presents with    Follow-up    Medication Refill     Lexapro doing well  2 thumbs up  Sleeping still off  Does ok doesn't drink caffeine  Feels like doesn't need much sleep.  Wt Readings from Last 3 Encounters:   10/24/24 75.8 kg (167 lb)   09/24/24 72.1 kg (159 lb)   03/04/24 68.9 kg (152 lb)         Admission on 08/31/2023, Discharged on 08/31/2023   Component Date Value Ref Range Status    WBC 08/31/2023 5.4  4.3 - 11.1 K/uL Final    RBC 08/31/2023 4.30  4.23 - 5.6 M/uL Final    Hemoglobin 08/31/2023 12.3 (L)  13.6 - 17.2 g/dL Final    Hematocrit 08/31/2023 38.7 (L)  41.1 - 50.3 % Final    MCV 08/31/2023 90.0  82.0 - 102.0 FL Final    MCH 08/31/2023 28.6  26.1 - 32.9 PG Final    MCHC 08/31/2023 31.8  31.4 - 35.0 g/dL Final    RDW 08/31/2023 12.7  11.9 - 14.6 % Final    Platelets 08/31/2023 306  150 - 450 K/uL Final    MPV 08/31/2023 9.6  9.4 - 12.3 FL Final    nRBC 08/31/2023 0.00  0.0 - 0.2 K/uL Final    **Note: Absolute NRBC parameter is now reported with Hemogram**    Differential Type 08/31/2023 AUTOMATED    Final    Neutrophils % 08/31/2023 58  43 - 78 % Final    Lymphocytes % 08/31/2023 21  13 - 44 % Final    Monocytes % 08/31/2023 14 (H)  4.0 - 12.0 % Final    Eosinophils % 08/31/2023 6  0.5 - 7.8 % Final    Basophils % 08/31/2023 1  0.0 - 2.0 % Final    Immature Granulocytes % 08/31/2023 1  0.0 - 5.0 % Final    Neutrophils Absolute 08/31/2023 3.1  1.7 - 8.2 K/UL Final    Lymphocytes Absolute 08/31/2023 1.1  0.5 - 4.6 K/UL Final    Monocytes Absolute 08/31/2023 0.7  0.1 - 1.3 K/UL Final    Eosinophils Absolute 08/31/2023 0.3  0.0 - 0.8 K/UL Final    Basophils Absolute 08/31/2023 0.1  0.0 - 0.2 K/UL Final    Immature Granulocytes Absolute 08/31/2023 0.0  0.0 - 0.5 K/UL Final    Sodium 08/31/2023 141  133 - 143 mmol/L Final    Potassium 08/31/2023 3.9  3.5 - 5.1 mmol/L Final    Chloride

## 2025-04-28 DIAGNOSIS — Z00.00 ROUTINE ADULT HEALTH MAINTENANCE: ICD-10-CM

## 2025-04-28 DIAGNOSIS — I10 PRIMARY HYPERTENSION: Primary | ICD-10-CM

## 2025-04-28 DIAGNOSIS — K21.9 GASTROESOPHAGEAL REFLUX DISEASE WITHOUT ESOPHAGITIS: ICD-10-CM

## (undated) DEVICE — MASTISOL ADHESIVE LIQ 2/3ML

## (undated) DEVICE — SUTURE VCRL SZ 3-0 L27IN ABSRB UD L26MM CT-2 1/2 CIR J232H

## (undated) DEVICE — PAD,NON-ADHERENT,3X8,STERILE,LF,1/PK: Brand: MEDLINE

## (undated) DEVICE — SUTURE PROL SZ 2-0 L30IN NONABSORBABLE BLU L26MM CT-2 1/2 8411H

## (undated) DEVICE — DRAIN SURG PENROSE 0.25X12 IN CLOSED WND DRAINAGE PREM SIL

## (undated) DEVICE — GARMENT,MEDLINE,DVT,INT,CALF,MED, GEN2: Brand: MEDLINE

## (undated) DEVICE — STRIP,CLOSURE,WOUND,MEDI-STRIP,1/2X4: Brand: MEDLINE

## (undated) DEVICE — GLOVE SURG SZ 8 CRM LTX FREE POLYISOPRENE POLYMER BEAD ANTI

## (undated) DEVICE — SUTURE VCRL SZ 2-0 L27IN ABSRB UD L26MM CT-2 1/2 CIR J269H

## (undated) DEVICE — 3M™ TEGADERM™ TRANSPARENT FILM DRESSING FRAME STYLE, 1626W, 4 IN X 4-3/4 IN (10 CM X 12 CM), 50/CT 4CT/CASE: Brand: 3M™ TEGADERM™

## (undated) DEVICE — SHEET, T, LAPAROTOMY, STERILE: Brand: MEDLINE

## (undated) DEVICE — NEEDLE HYPO 21GA L1.5IN INTRAMUSCULAR S STL LATCH BVL UP

## (undated) DEVICE — SUTURE VCRL SZ 4-0 L18IN ABSRB UD L19MM PS-2 3/8 CIR PRIM J496H

## (undated) DEVICE — MINOR SPLIT GENERAL: Brand: MEDLINE INDUSTRIES, INC.

## (undated) DEVICE — SOLUTION IRRIG 1000ML 09% SOD CHL USP PIC PLAS CONTAINER